# Patient Record
Sex: FEMALE | Race: BLACK OR AFRICAN AMERICAN | NOT HISPANIC OR LATINO | Employment: OTHER | URBAN - NONMETROPOLITAN AREA
[De-identification: names, ages, dates, MRNs, and addresses within clinical notes are randomized per-mention and may not be internally consistent; named-entity substitution may affect disease eponyms.]

---

## 2024-04-22 ENCOUNTER — OFFICE VISIT (OUTPATIENT)
Dept: FAMILY MEDICINE CLINIC | Facility: CLINIC | Age: 69
End: 2024-04-22
Payer: COMMERCIAL

## 2024-04-22 VITALS
TEMPERATURE: 97.5 F | SYSTOLIC BLOOD PRESSURE: 112 MMHG | DIASTOLIC BLOOD PRESSURE: 66 MMHG | HEART RATE: 117 BPM | WEIGHT: 157.8 LBS | BODY MASS INDEX: 29.79 KG/M2 | HEIGHT: 61 IN | OXYGEN SATURATION: 98 %

## 2024-04-22 DIAGNOSIS — Z11.4 SCREENING FOR HIV (HUMAN IMMUNODEFICIENCY VIRUS): ICD-10-CM

## 2024-04-22 DIAGNOSIS — E11.9 TYPE 2 DIABETES MELLITUS WITHOUT COMPLICATION, WITH LONG-TERM CURRENT USE OF INSULIN (HCC): ICD-10-CM

## 2024-04-22 DIAGNOSIS — Z79.4 TYPE 2 DIABETES MELLITUS WITHOUT COMPLICATION, WITH LONG-TERM CURRENT USE OF INSULIN (HCC): ICD-10-CM

## 2024-04-22 DIAGNOSIS — G20.A1 PARKINSON'S DISEASE, UNSPECIFIED WHETHER DYSKINESIA PRESENT, UNSPECIFIED WHETHER MANIFESTATIONS FLUCTUATE: ICD-10-CM

## 2024-04-22 DIAGNOSIS — N39.3 STRESS INCONTINENCE OF URINE: ICD-10-CM

## 2024-04-22 DIAGNOSIS — E03.9 ACQUIRED HYPOTHYROIDISM: Primary | ICD-10-CM

## 2024-04-22 DIAGNOSIS — Z11.59 ENCOUNTER FOR HEPATITIS C SCREENING TEST FOR LOW RISK PATIENT: ICD-10-CM

## 2024-04-22 DIAGNOSIS — R15.9 INCONTINENCE OF FECES, UNSPECIFIED FECAL INCONTINENCE TYPE: ICD-10-CM

## 2024-04-22 PROBLEM — K21.9 GASTROESOPHAGEAL REFLUX DISEASE WITHOUT ESOPHAGITIS: Status: ACTIVE | Noted: 2024-04-22

## 2024-04-22 PROBLEM — R42 VERTIGO: Status: ACTIVE | Noted: 2024-04-22

## 2024-04-22 PROCEDURE — 99204 OFFICE O/P NEW MOD 45 MIN: CPT | Performed by: FAMILY MEDICINE

## 2024-04-22 RX ORDER — ENALAPRIL MALEATE 2.5 MG/1
2.5 TABLET ORAL DAILY
COMMUNITY
Start: 2024-03-19

## 2024-04-22 RX ORDER — BLOOD SUGAR DIAGNOSTIC
STRIP MISCELLANEOUS
COMMUNITY
Start: 2024-01-16

## 2024-04-22 RX ORDER — ISOPROPYL ALCOHOL 0.7 ML/ML
SWAB TOPICAL
COMMUNITY
Start: 2024-03-01

## 2024-04-22 RX ORDER — EMPAGLIFLOZIN, METFORMIN HYDROCHLORIDE 5; 1000 MG/1; MG/1
TABLET, EXTENDED RELEASE ORAL
COMMUNITY
Start: 2024-03-29

## 2024-04-22 RX ORDER — LANCETS 33 GAUGE
EACH MISCELLANEOUS
COMMUNITY
Start: 2024-02-23

## 2024-04-22 RX ORDER — LEVOTHYROXINE SODIUM 0.07 MG/1
75 TABLET ORAL DAILY
COMMUNITY
Start: 2024-04-15

## 2024-04-22 RX ORDER — PANTOPRAZOLE SODIUM 40 MG/1
40 TABLET, DELAYED RELEASE ORAL
COMMUNITY
Start: 2024-04-16

## 2024-04-22 RX ORDER — AMMONIUM LACTATE 12 G/100G
CREAM TOPICAL
COMMUNITY
Start: 2024-03-19

## 2024-04-22 RX ORDER — ATORVASTATIN CALCIUM 40 MG/1
40 TABLET, FILM COATED ORAL DAILY
COMMUNITY
Start: 2024-02-09

## 2024-04-22 RX ORDER — FAMOTIDINE 40 MG/1
40 TABLET, FILM COATED ORAL EVERY MORNING
COMMUNITY
Start: 2024-02-14

## 2024-04-22 RX ORDER — TEMAZEPAM 30 MG/1
30 CAPSULE ORAL
COMMUNITY
Start: 2024-04-15 | End: 2024-04-22 | Stop reason: ALTCHOICE

## 2024-04-23 NOTE — PROGRESS NOTES
Assessment/Plan:    No problem-specific Assessment & Plan notes found for this encounter.       Diagnoses and all orders for this visit:    Acquired hypothyroidism  -   Patient reports diagnosis of hypothyroidism x 1 year ago, has been maintained on levothyroxine 75 mcg daily  -PCP to obtain baseline lab work to assess need for medical management changes  -Patient denies palpitations but does report worsening tremors likely in setting of underlying Parkinson's disease versus medication side effect  - CBC and differential; Future  -     Comprehensive metabolic panel; Future  -     T4, free; Future  -     TSH, 3rd generation; Future    Parkinson's disease, unspecified whether dyskinesia present, unspecified whether manifestations fluctuate  -Patient with known history of Parkinson's diagnosed x 2 years ago follows up with [facility] neurology. Patient is accompanied by family friend at today's visit who expresses concern for worsening labile moods i.e. periods of severe aggression, paranoia and depressive symptoms x 7-8 months.  PCP discussed at length that would consider further evaluation with neurology as some symptoms may be progression of Parkinson's or Parkinson's dementia versus underlying mood disorder.  PCP will continue to evaluate and refer to psychiatry/psychology.    Incontinence of feces, unspecified fecal incontinence type  -Patient reports history of fecal and urinary incontinence x 4 years and is currently undergoing evaluation with urology with reported uterine prolapse likely contributing to symptomology  -PCP to obtain records from urology and discuss further referral to gastroenterology as appropriate    Stress incontinence of urine  - See A/P under incontinence of feces    Type 2 diabetes mellitus without complication, with long-term current use of insulin ([facility])  -     Patient reports she was diagnosed x 11 years ago and has been maintained on oral medications since then  -     She denies any  hypoglycemic episodes or hospitalizations  -    CBC and differential; Future  -     Comprehensive metabolic panel; Future  -     Lipid panel; Future  -     Hemoglobin A1C; Future  -     Albumin / creatinine urine ratio; Future    Screening for HIV (human immunodeficiency virus)  -     HIV 1/2 AG/AB w Reflex [facility] for 2 yr old and above; Future    Encounter for hepatitis C screening test for low risk patient  -     Hepatitis C antibody; Future            Subjective:      Patient ID: Sylvie Rodríguez is a 69 y.o. female.    Patient is a 69-year-old female with past medical history of Parkinson's disease, type 2 diabetes mellitus, BPPV, GERD, and acquired hypothyroidism who presents to establish care  Patient reports that over the last 4 years she has experienced worsening urinary and fecal incontinence  Patient reports that in the last year she has had worsening colicky pain especially postprandially and notices fecal incontinence typically within 15 minutes of p.o. intake  Patient reports that she has been undergoing evaluation with urology x 1 month and was also ordered a transvaginal ultrasound at which time she was found to have uterine prolapse likely being etiology of urinary incontinence  Patient that urinary incontinence also occurs with coughing, bending, or bearing down  Patient denies any hematuria or dysuria but does report intermittent vaginal itching.  No discharge or abnormal uterine bleeding noted thus far.  Patient does report that a month ago she had an episode of maroon-colored stool but denied any other alarm symptoms i.e. nausea, vomiting, unexpected weight loss  Patient reports this was a single episode and reports that typically she has softer stools  PCP reviewed [place] stool chart patient noting that baseline BM is type 5 and when she experiences colicky pain and fecal incontinence is a type 7  Patient reports that she experiences about 3-4 episodes of colicky pain and softer stools within a 1  "month.  Patient reports that her last colonoscopy was greater than 10 years ago and reports that at that time she was told she had benign polyps and internal hemorrhoids    Patient is accompanied by a family friend who reports concern that over the last 7-8 months she has had worsening labile moods i.e. periods of severe aggression, paranoia and depressive symptoms  Patient reports that she has been following with Neurology x 2 years at which time she was diagnosed with Parkinson's and has been maintained on Sinemet only  Patient does admit that she has had multiple episodes where she starts a task at home i.e. cooking in the kitchen and forgets to turn off the stove patient has had episodes where she will go to a grocery store and forget what she was attempting to buy and will walk out of the store and not be able to find her way back home.  Family friend also reports that patient goes into \"severe moods\" where she will not talk to anyone is severely withdrawn and experiences episodes of suicidal ideation.  Patient, however, states that at this time she does not SI.  Patient also denies any AH or VH.              Depression Screening and Follow-up Plan: Patient's depression screening was positive with a PHQ-2 score of 6. Their PHQ-9 score was 18. Patient assessed for underlying major depression. Brief counseling provided and recommend additional follow-up/re-evaluation next office visit.     Urinary Incontinence Plan of Care: counseling topics discussed: limiting fluid intake 3-4 hours before bed. Patient reports that she already follows with Urology at [facility].           The following portions of the patient's history were reviewed and updated as appropriate: allergies, current medications, past family history, past medical history, past social history, past surgical history, and problem list.    Review of Systems   Respiratory:  Negative for shortness of breath.    Cardiovascular:  Negative for chest pain. " "  Gastrointestinal:  Positive for diarrhea. Negative for blood in stool.   Genitourinary:  Positive for urgency.   Psychiatric/Behavioral:  Positive for agitation. Negative for hallucinations and suicidal ideas. The patient is nervous/anxious.          Objective:      /66 (BP Location: Left arm)   Pulse (!) 117   Temp 97.5 °F (36.4 °C) (Tympanic)   Ht 5' 0.6\" (1.539 m)   Wt 71.6 kg (157 lb 12.8 oz)   SpO2 98%   BMI 30.21 kg/m²          Physical Exam  Constitutional:       Appearance: She is well-developed.   HENT:      Head: Normocephalic and atraumatic.      Right Ear: External ear normal.      Left Ear: External ear normal.      Nose: Nose normal.   Eyes:      Conjunctiva/sclera: Conjunctivae normal.   Cardiovascular:      Rate and Rhythm: Normal rate and regular rhythm.      Heart sounds: Normal heart sounds.   Pulmonary:      Effort: Pulmonary effort is normal.      Breath sounds: Normal breath sounds.   Abdominal:      General: Bowel sounds are normal.      Palpations: Abdomen is soft. There is no mass.   Musculoskeletal:         General: Normal range of motion.   Skin:     General: Skin is warm and dry.   Neurological:      Mental Status: She is alert and oriented to person, place, and time.           "

## 2024-04-25 ENCOUNTER — TELEPHONE (OUTPATIENT)
Age: 69
End: 2024-04-25

## 2024-04-25 ENCOUNTER — LAB (OUTPATIENT)
Dept: LAB | Facility: CLINIC | Age: 69
End: 2024-04-25
Payer: COMMERCIAL

## 2024-04-25 DIAGNOSIS — Z11.59 ENCOUNTER FOR HEPATITIS C SCREENING TEST FOR LOW RISK PATIENT: ICD-10-CM

## 2024-04-25 DIAGNOSIS — Z79.4 TYPE 2 DIABETES MELLITUS WITHOUT COMPLICATION, WITH LONG-TERM CURRENT USE OF INSULIN (HCC): ICD-10-CM

## 2024-04-25 DIAGNOSIS — E03.9 ACQUIRED HYPOTHYROIDISM: ICD-10-CM

## 2024-04-25 DIAGNOSIS — E11.9 TYPE 2 DIABETES MELLITUS WITHOUT COMPLICATION, WITH LONG-TERM CURRENT USE OF INSULIN (HCC): ICD-10-CM

## 2024-04-25 DIAGNOSIS — Z11.4 SCREENING FOR HIV (HUMAN IMMUNODEFICIENCY VIRUS): ICD-10-CM

## 2024-04-25 LAB
ALBUMIN SERPL BCP-MCNC: 4.2 G/DL (ref 3.5–5)
ALP SERPL-CCNC: 107 U/L (ref 34–104)
ALT SERPL W P-5'-P-CCNC: 31 U/L (ref 7–52)
ANION GAP SERPL CALCULATED.3IONS-SCNC: 10 MMOL/L (ref 4–13)
AST SERPL W P-5'-P-CCNC: 31 U/L (ref 13–39)
BASOPHILS # BLD AUTO: 0.06 THOUSANDS/ÂΜL (ref 0–0.1)
BASOPHILS NFR BLD AUTO: 1 % (ref 0–1)
BILIRUB SERPL-MCNC: 0.52 MG/DL (ref 0.2–1)
BUN SERPL-MCNC: 9 MG/DL (ref 5–25)
CALCIUM SERPL-MCNC: 9.6 MG/DL (ref 8.4–10.2)
CHLORIDE SERPL-SCNC: 99 MMOL/L (ref 96–108)
CHOLEST SERPL-MCNC: 223 MG/DL
CO2 SERPL-SCNC: 28 MMOL/L (ref 21–32)
CREAT SERPL-MCNC: 0.46 MG/DL (ref 0.6–1.3)
CREAT UR-MCNC: 35 MG/DL
EOSINOPHIL # BLD AUTO: 0.14 THOUSAND/ÂΜL (ref 0–0.61)
EOSINOPHIL NFR BLD AUTO: 1 % (ref 0–6)
ERYTHROCYTE [DISTWIDTH] IN BLOOD BY AUTOMATED COUNT: 15.6 % (ref 11.6–15.1)
EST. AVERAGE GLUCOSE BLD GHB EST-MCNC: 197 MG/DL
GFR SERPL CREATININE-BSD FRML MDRD: 102 ML/MIN/1.73SQ M
GLUCOSE P FAST SERPL-MCNC: 178 MG/DL (ref 65–99)
HBA1C MFR BLD: 8.5 %
HCT VFR BLD AUTO: 45.2 % (ref 34.8–46.1)
HCV AB SER QL: NORMAL
HDLC SERPL-MCNC: 51 MG/DL
HGB BLD-MCNC: 14.1 G/DL (ref 11.5–15.4)
HIV 1+2 AB+HIV1 P24 AG SERPL QL IA: NORMAL
HIV 2 AB SERPL QL IA: NORMAL
HIV1 AB SERPL QL IA: NORMAL
HIV1 P24 AG SERPL QL IA: NORMAL
IMM GRANULOCYTES # BLD AUTO: 0.02 THOUSAND/UL (ref 0–0.2)
IMM GRANULOCYTES NFR BLD AUTO: 0 % (ref 0–2)
LYMPHOCYTES # BLD AUTO: 3.24 THOUSANDS/ÂΜL (ref 0.6–4.47)
LYMPHOCYTES NFR BLD AUTO: 32 % (ref 14–44)
MCH RBC QN AUTO: 27.5 PG (ref 26.8–34.3)
MCHC RBC AUTO-ENTMCNC: 31.2 G/DL (ref 31.4–37.4)
MCV RBC AUTO: 88 FL (ref 82–98)
MICROALBUMIN UR-MCNC: <7 MG/L
MICROALBUMIN/CREAT 24H UR: <20 MG/G CREATININE (ref 0–30)
MONOCYTES # BLD AUTO: 0.7 THOUSAND/ÂΜL (ref 0.17–1.22)
MONOCYTES NFR BLD AUTO: 7 % (ref 4–12)
NEUTROPHILS # BLD AUTO: 6.06 THOUSANDS/ÂΜL (ref 1.85–7.62)
NEUTS SEG NFR BLD AUTO: 59 % (ref 43–75)
NONHDLC SERPL-MCNC: 172 MG/DL
NRBC BLD AUTO-RTO: 0 /100 WBCS
PLATELET # BLD AUTO: 297 THOUSANDS/UL (ref 149–390)
PMV BLD AUTO: 10.4 FL (ref 8.9–12.7)
POTASSIUM SERPL-SCNC: 4 MMOL/L (ref 3.5–5.3)
PROT SERPL-MCNC: 7.5 G/DL (ref 6.4–8.4)
RBC # BLD AUTO: 5.13 MILLION/UL (ref 3.81–5.12)
SODIUM SERPL-SCNC: 137 MMOL/L (ref 135–147)
T4 FREE SERPL-MCNC: 0.92 NG/DL (ref 0.61–1.12)
TRIGL SERPL-MCNC: 429 MG/DL
TSH SERPL DL<=0.05 MIU/L-ACNC: 0.7 UIU/ML (ref 0.45–4.5)
WBC # BLD AUTO: 10.22 THOUSAND/UL (ref 4.31–10.16)

## 2024-04-25 PROCEDURE — 86803 HEPATITIS C AB TEST: CPT

## 2024-04-25 PROCEDURE — 84439 ASSAY OF FREE THYROXINE: CPT

## 2024-04-25 PROCEDURE — 87389 HIV-1 AG W/HIV-1&-2 AB AG IA: CPT

## 2024-04-25 PROCEDURE — 83036 HEMOGLOBIN GLYCOSYLATED A1C: CPT

## 2024-04-25 PROCEDURE — 36415 COLL VENOUS BLD VENIPUNCTURE: CPT

## 2024-04-25 PROCEDURE — 85025 COMPLETE CBC W/AUTO DIFF WBC: CPT

## 2024-04-25 PROCEDURE — 82043 UR ALBUMIN QUANTITATIVE: CPT

## 2024-04-25 PROCEDURE — 80061 LIPID PANEL: CPT

## 2024-04-25 PROCEDURE — 3052F HG A1C>EQUAL 8.0%<EQUAL 9.0%: CPT | Performed by: FAMILY MEDICINE

## 2024-04-25 PROCEDURE — 82570 ASSAY OF URINE CREATININE: CPT

## 2024-04-25 PROCEDURE — 80053 COMPREHEN METABOLIC PANEL: CPT

## 2024-04-25 PROCEDURE — 84443 ASSAY THYROID STIM HORMONE: CPT

## 2024-04-25 NOTE — TELEPHONE ENCOUNTER
Pt called today stating glucose today was 282 and yesterday it was 253.  Said she has not had any Toujeo insulin in a few weeks.  Do you want to prescribe?  Please send to [facility].

## 2024-04-26 NOTE — TELEPHONE ENCOUNTER
Pharmacy called and sated Pt was there and was requesting her refill for insuline. Pharmacist was informed of parts of the providers note on Pt's chart Pharmacist sated she would inform the Pt.

## 2024-04-26 NOTE — RESULT ENCOUNTER NOTE
Hi Ladies,    PCP already sent message regarding phone call about blood sugars being elevated. Please inform patient regarding the labs as noted below.    After review of your lab work, overall things look stable. Blood counts, kidney, liver and thyroid function are normal. The Hep C and HIV are negative. Cholesterol levels were very elevated and we need to discuss treatment for this. Hemoglobin A1c at 8.5 indicating poorly controlled diabetes at this time. Please let me know if you have any questions or concerns.    Best,

## 2024-04-29 ENCOUNTER — OFFICE VISIT (OUTPATIENT)
Dept: FAMILY MEDICINE CLINIC | Facility: CLINIC | Age: 69
End: 2024-04-29
Payer: COMMERCIAL

## 2024-04-29 VITALS
WEIGHT: 153.4 LBS | DIASTOLIC BLOOD PRESSURE: 72 MMHG | HEART RATE: 110 BPM | HEIGHT: 61 IN | TEMPERATURE: 98.6 F | BODY MASS INDEX: 28.96 KG/M2 | OXYGEN SATURATION: 100 % | SYSTOLIC BLOOD PRESSURE: 110 MMHG

## 2024-04-29 DIAGNOSIS — R10.13 EPIGASTRIC PAIN: Primary | ICD-10-CM

## 2024-04-29 DIAGNOSIS — E11.9 TYPE 2 DIABETES MELLITUS WITHOUT COMPLICATION, WITH LONG-TERM CURRENT USE OF INSULIN (HCC): ICD-10-CM

## 2024-04-29 DIAGNOSIS — Z79.4 TYPE 2 DIABETES MELLITUS WITHOUT COMPLICATION, WITH LONG-TERM CURRENT USE OF INSULIN (HCC): ICD-10-CM

## 2024-04-29 PROBLEM — N81.4 UTERINE PROLAPSE: Status: ACTIVE | Noted: 2024-04-29

## 2024-04-29 PROBLEM — G20.A1 PARKINSON'S DISEASE: Status: ACTIVE | Noted: 2024-04-29

## 2024-04-29 PROCEDURE — 99214 OFFICE O/P EST MOD 30 MIN: CPT | Performed by: FAMILY MEDICINE

## 2024-04-29 PROCEDURE — G2211 COMPLEX E/M VISIT ADD ON: HCPCS | Performed by: FAMILY MEDICINE

## 2024-04-29 PROCEDURE — 1159F MED LIST DOCD IN RCRD: CPT | Performed by: FAMILY MEDICINE

## 2024-04-29 PROCEDURE — 1160F RVW MEDS BY RX/DR IN RCRD: CPT | Performed by: FAMILY MEDICINE

## 2024-04-29 NOTE — PROGRESS NOTES
Assessment/Plan:    No problem-specific Assessment & Plan notes found for this encounter.       Diagnoses and all orders for this visit:    Epigastric pain  -Patient with known history of GERD and possible hiatal hernia presents with concern of worsening epigastric pain and 3 episodes of black tarry stools today  -Patient reports that she has been taking her PPI but feels that this is only slightly helping her symptoms at this time  -PCP discussed with patient that she is already on max dose PPI therapy and symptoms may be due to underlying PUD versus GERD exacerbation  -Patient vitals are stable and she denies any bright red blood per rectum at this time.  Patient already is established with gastroenterology Dr. Calixto and was counseled to follow-up with him for further recommendations at this time.    Type 2 diabetes mellitus without complication, with long-term current use of insulin ([facility])  -     Patient with blood work showing hemoglobin A1c of 8.5, PCP clarified with patient she was not on insulin she was actually on Trulicity but she discontinued this as she experienced severe itching with this medication  -Patient continues on Synjardy and after further discussion patient will trial on Sitagliptin for further DM control  -     sitaGLIPtin (JANUVIA) 100 mg tablet; Take 1 tablet (100 mg total) by mouth daily          Subjective:      Patient ID: Sylvie Rodríguez is a 69 y.o. female.    Abdominal Pain  This is a recurrent problem. The current episode started more than 1 year ago. The problem has been waxing and waning. The pain is located in the epigastric region. The quality of the pain is burning. The abdominal pain does not radiate. Pertinent negatives include no constipation or hematochezia. Associated symptoms comments: 3 episodes of dark tarry stools. Nothing aggravates the pain. The pain is relieved by Nothing. She has tried proton pump inhibitors for the symptoms. The treatment provided mild relief. Prior  "diagnostic workup includes GI consult.   Diabetes  She presents for her follow-up diabetic visit. She has type 2 diabetes mellitus. Disease course: A1C at 8.5. There are no hypoglycemic associated symptoms. Pertinent negatives for diabetes include no foot paresthesias. There are no hypoglycemic complications. Pertinent negatives for diabetic complications include no nephropathy. Risk factors for coronary artery disease include diabetes mellitus, dyslipidemia and obesity. Current diabetic treatment includes oral agent (dual therapy). She is compliant with treatment all of the time. An ACE inhibitor/angiotensin II receptor blocker is being taken.       The following portions of the patient's history were reviewed and updated as appropriate: allergies, current medications, past family history, past medical history, past social history, past surgical history, and problem list.    Review of Systems   Gastrointestinal:  Positive for abdominal pain. Negative for constipation and hematochezia.        Darky tarry stools         Objective:      /72 (BP Location: Left arm)   Pulse (!) 110   Temp 98.6 °F (37 °C) (Tympanic)   Ht 5' 0.6\" (1.539 m)   Wt 69.6 kg (153 lb 6.4 oz)   SpO2 100%   BMI 29.37 kg/m²          Physical Exam  Constitutional:       Appearance: She is well-developed.   HENT:      Head: Normocephalic and atraumatic.      Right Ear: External ear normal.      Left Ear: External ear normal.      Nose: Nose normal.   Eyes:      Conjunctiva/sclera: Conjunctivae normal.   Cardiovascular:      Rate and Rhythm: Normal rate and regular rhythm.      Heart sounds: Normal heart sounds.   Pulmonary:      Effort: Pulmonary effort is normal.      Breath sounds: Normal breath sounds.   Skin:     General: Skin is warm and dry.   Neurological:      Mental Status: She is alert and oriented to person, place, and time.           "

## 2024-04-30 ENCOUNTER — TELEPHONE (OUTPATIENT)
Dept: FAMILY MEDICINE CLINIC | Facility: CLINIC | Age: 69
End: 2024-04-30

## 2024-04-30 NOTE — TELEPHONE ENCOUNTER
PCP already sent message regarding phone call about blood sugars being elevated. Please inform patient regarding the labs as noted below.     After review of your lab work, overall things look stable. Blood counts, kidney, liver and thyroid function are normal. The Hep C and HIV are negative. Cholesterol levels were very elevated and we need to discuss treatment for this. Hemoglobin A1c at 8.5 indicating poorly controlled diabetes at this time. Please let me know if you have any questions or concerns.     Best,        *Called patient to reply dr's response. She didn't answer, I left a voicemail to called the office back to discuss labs results.

## 2024-05-06 DIAGNOSIS — E11.9 TYPE 2 DIABETES MELLITUS WITHOUT COMPLICATION, WITH LONG-TERM CURRENT USE OF INSULIN (HCC): Primary | ICD-10-CM

## 2024-05-06 DIAGNOSIS — Z79.4 TYPE 2 DIABETES MELLITUS WITHOUT COMPLICATION, WITH LONG-TERM CURRENT USE OF INSULIN (HCC): Primary | ICD-10-CM

## 2024-05-08 NOTE — TELEPHONE ENCOUNTER
[id number]:   Called patient to see if refill is needed blood sugar strips are needed. Patient does need refill.

## 2024-05-10 RX ORDER — BLOOD SUGAR DIAGNOSTIC
STRIP MISCELLANEOUS
Qty: 100 EACH | Refills: 3 | Status: SHIPPED | OUTPATIENT
Start: 2024-05-10

## 2024-06-24 ENCOUNTER — OFFICE VISIT (OUTPATIENT)
Dept: FAMILY MEDICINE CLINIC | Facility: CLINIC | Age: 69
End: 2024-06-24
Payer: COMMERCIAL

## 2024-06-24 VITALS
HEART RATE: 92 BPM | OXYGEN SATURATION: 99 % | DIASTOLIC BLOOD PRESSURE: 58 MMHG | TEMPERATURE: 96 F | WEIGHT: 155 LBS | HEIGHT: 61 IN | SYSTOLIC BLOOD PRESSURE: 110 MMHG | BODY MASS INDEX: 29.27 KG/M2

## 2024-06-24 DIAGNOSIS — E78.2 MIXED HYPERLIPIDEMIA: ICD-10-CM

## 2024-06-24 DIAGNOSIS — Z00.00 MEDICARE ANNUAL WELLNESS VISIT, SUBSEQUENT: Primary | ICD-10-CM

## 2024-06-24 PROCEDURE — G0439 PPPS, SUBSEQ VISIT: HCPCS | Performed by: FAMILY MEDICINE

## 2024-06-24 RX ORDER — ATORVASTATIN CALCIUM 40 MG/1
40 TABLET, FILM COATED ORAL DAILY
Qty: 180 TABLET | Refills: 0 | Status: SHIPPED | OUTPATIENT
Start: 2024-06-24 | End: 2024-06-26

## 2024-06-24 RX ORDER — INSULIN GLARGINE 100 [IU]/ML
50 INJECTION, SOLUTION SUBCUTANEOUS
COMMUNITY
End: 2024-06-24 | Stop reason: ALTCHOICE

## 2024-06-24 RX ORDER — TEMAZEPAM 7.5 MG/1
7.5 CAPSULE ORAL
COMMUNITY
End: 2024-06-24 | Stop reason: ALTCHOICE

## 2024-06-24 NOTE — PATIENT INSTRUCTIONS

## 2024-06-24 NOTE — PROGRESS NOTES
Ambulatory Visit  Name: Sylvie Rodríguez      : 1955      MRN: [id number]  Encounter Provider: Cindy Saleh MD  Encounter Date: 2024   Encounter department: [facility]    Assessment & Plan   1. Medicare annual wellness visit, subsequent  -Patient reports completing colonoscopy approximately 5 years ago, states that 1 polyp was found but has not followed up thereafter for routine screening     -PCP counseled patient that she should follow-up with her new provider after she transitions moving to [place].  Patient is to move to [place] at the end of July, PCP did discuss with her son currently living in [place] and confirmed moved.  Patient will be due for colorectal cancer screening, breast cancer screening, and DEXA screening at that time  -PCP discussed with patient that as part of transition to a different state will prepare EMR and paper lips so that patient may have sufficient medication until she establishes with new PCP  - atorvastatin (LIPITOR) 40 mg tablet; Take 1 tablet (40 mg total) by mouth daily  2. Mixed hyperlipidemia  -     atorvastatin (LIPITOR) 40 mg tablet; Take 1 tablet (40 mg total) by mouth daily      Depression Screening and Follow-up Plan: Patient was screened for depression during today's encounter. They screened negative with a PHQ-2 score of 1.    Falls Plan of Care: balance, strength, and gait training instructions were provided.       Preventive health issues were discussed with patient, and age appropriate screening tests were ordered as noted in patient's After Visit Summary. Personalized health advice and appropriate referrals for health education or preventive services given if needed, as noted in patient's After Visit Summary.    History of Present Illness     Patient did complete a 5-day course of Macrobid which was appropriate based on sensitivities from previous urine culture x 2 weeks ago         Patient Care Team:  Cindy  MD Delfino as PCP - General (Family Medicine)  Twin Morataya MD as PCP - PCP-[facility] ([facility])    Review of Systems   Genitourinary:  Negative for difficulty urinating.     Medical History Reviewed by provider this encounter:       Annual Wellness Visit Questionnaire   Sylvie is here for her Subsequent Wellness visit. Last Medicare Wellness visit information reviewed, patient interviewed and updates made to the record today.      Health Risk Assessment:   Patient rates overall health as good. Patient feels that their physical health rating is slightly better. Patient is satisfied with their life. Eyesight was rated as same. Hearing was rated as same. Patient feels that their emotional and mental health rating is slightly better. Patients states they are often angry. Patient states they are often unusually tired/fatigued. Pain experienced in the last 7 days has been some. Patient's pain rating has been 7/10. Patient states that she has experienced no weight loss or gain in last 6 months.     Depression Screening:   PHQ-2 Score: 1      Fall Risk Screening:   In the past year, patient has experienced: history of falling in past year    Number of falls: 2 or more  Injured during fall?: Yes    Feels unsteady when standing or walking?: Yes    Worried about falling?: Yes      Urinary Incontinence Screening:   Patient has leaked urine accidently in the last six months.     Home Safety:  Patient does not have trouble with stairs inside or outside of their home. Patient has working smoke alarms and has working carbon monoxide detector. Home safety hazards include: none.     Nutrition:   Current diet is Regular and Low Cholesterol.     Medications:   Patient is not currently taking any over-the-counter supplements. Patient is able to manage medications.     Activities of Daily Living (ADLs)/Instrumental Activities of Daily Living (IADLs):   Walk and transfer into and out of bed and chair?: Yes  Dress and groom  yourself?: Yes    Bathe or shower yourself?: Yes    Feed yourself? Yes  Do your laundry/housekeeping?: No  Manage your money, pay your bills and track your expenses?: Yes  Make your own meals?: No    Do your own shopping?: Yes    Previous Hospitalizations:   Any hospitalizations or ED visits within the last 12 months?: Yes    How many hospitalizations have you had in the last year?: 1-2    Hospitalization Comments: Infection in the urine  Patient did complete a 5-day course of Macrobid which was appropriate based on sensitivities from previous urine culture x 2 weeks ago    Advance Care Planning:   Living will: No    Durable POA for healthcare: No    Advanced directive: No      PREVENTIVE SCREENINGS      Cardiovascular Screening:    General: Screening Current and Risks and Benefits Discussed      Diabetes Screening:     General: Screening Not Indicated, History Diabetes and Risks and Benefits Discussed      Colorectal Cancer Screening:     General: Risks and Benefits Discussed      Cervical Cancer Screening:    General: Screening Not Indicated      Osteoporosis Screening:    General: Risks and Benefits Discussed      Lung Cancer Screening:     General: Screening Not Indicated      Hepatitis C Screening:    General: Screening Current    Screening, Brief Intervention, and Referral to Treatment (SBIRT)    Screening  Typical number of drinks in a day: 0  Typical number of drinks in a week: 0  Interpretation: Low risk drinking behavior.    AUDIT-C Screenin) How often did you have a drink containing alcohol in the past year? never  2) How many drinks did you have on a typical day when you were drinking in the past year? 0  3) How often did you have 6 or more drinks on one occasion in the past year? never    AUDIT-C Score: 0  Interpretation: Score 0-2 (female): Negative screen for alcohol misuse    Single Item Drug Screening:  How often have you used an illegal drug (including marijuana) or a prescription medication  "for non-medical reasons in the past year? never    Single Item Drug Screen Score: 0  Interpretation: Negative screen for possible drug use disorder    Social Determinants of Health     Food Insecurity: No Food Insecurity (6/24/2024)    Hunger Vital Sign     Worried About Running Out of Food in the Last Year: Never true     Ran Out of Food in the Last Year: Never true   Transportation Needs: No Transportation Needs (6/24/2024)    PRAPARE - Transportation     Lack of Transportation (Medical): No     Lack of Transportation (Non-Medical): No   Housing Stability: Low Risk  (6/24/2024)    Housing Stability Vital Sign     Unable to Pay for Housing in the Last Year: No     Number of Times Moved in the Last Year: 1     Homeless in the Last Year: No   Utilities: Not At Risk (6/24/2024)    [facility] Utilities     Threatened with loss of utilities: No     No results found.    Objective     /58 (BP Location: Left arm)   Pulse 92   Temp (!) 96 °F (35.6 °C) (Tympanic)   Ht 5' 0.6\" (1.539 m)   Wt 70.3 kg (155 lb)   SpO2 99%   BMI 29.67 kg/m²     Physical Exam  Constitutional:       Appearance: She is well-developed.   HENT:      Head: Normocephalic and atraumatic.      Right Ear: External ear normal.      Left Ear: External ear normal.   Eyes:      Conjunctiva/sclera: Conjunctivae normal.   Cardiovascular:      Rate and Rhythm: Normal rate and regular rhythm.      Pulses: no weak pulses.           Dorsalis pedis pulses are 2+ on the right side and 2+ on the left side.        Posterior tibial pulses are 2+ on the right side and 2+ on the left side.      Heart sounds: Normal heart sounds.   Pulmonary:      Effort: Pulmonary effort is normal.      Breath sounds: Normal breath sounds.   Musculoskeletal:      Cervical back: Normal range of motion and neck supple.   Feet:      Right foot:      Skin integrity: No ulcer, skin breakdown, erythema, warmth, callus or dry skin.      Left foot:      Skin integrity: No ulcer, skin " breakdown, erythema, warmth, callus or dry skin.   Skin:     General: Skin is warm and dry.   Neurological:      Mental Status: She is alert and oriented to person, place, and time.       Diabetic Foot Exam    Patient's shoes and socks removed.    Right Foot/Ankle   Right Foot Inspection  Skin Exam: skin normal and skin intact. No dry skin, no warmth, no callus, no erythema, no maceration, no abnormal color, no pre-ulcer, no ulcer and no callus.     Toe Exam: ROM and strength within normal limits.     Sensory   Vibration: intact  Proprioception: intact  Monofilament testing: intact    Vascular  The right DP pulse is 2+. The right PT pulse is 2+.     Left Foot/Ankle  Left Foot Inspection  Skin Exam: skin normal and skin intact. No dry skin, no warmth, no erythema, no maceration, normal color, no pre-ulcer, no ulcer and no callus.     Toe Exam: ROM and strength within normal limits.     Sensory   Vibration: intact  Proprioception: intact  Monofilament testing: intact    Vascular  The left DP pulse is 2+. The left PT pulse is 2+.     Assign Risk Category  No deformity present  No loss of protective sensation  No weak pulses  Risk: 0      Administrative Statements

## 2024-06-26 DIAGNOSIS — Z00.00 MEDICARE ANNUAL WELLNESS VISIT, SUBSEQUENT: ICD-10-CM

## 2024-06-26 DIAGNOSIS — Z79.4 TYPE 2 DIABETES MELLITUS WITHOUT COMPLICATION, WITH LONG-TERM CURRENT USE OF INSULIN (HCC): ICD-10-CM

## 2024-06-26 DIAGNOSIS — E78.2 MIXED HYPERLIPIDEMIA: ICD-10-CM

## 2024-06-26 DIAGNOSIS — E11.9 TYPE 2 DIABETES MELLITUS WITHOUT COMPLICATION, WITH LONG-TERM CURRENT USE OF INSULIN (HCC): ICD-10-CM

## 2024-06-26 RX ORDER — GLUCOSAM/CHON-MSM1/C/MANG/BOSW 500-416.6
TABLET ORAL
Qty: 100 EACH | Refills: 5 | Status: SHIPPED | OUTPATIENT
Start: 2024-06-26

## 2024-06-26 RX ORDER — SITAGLIPTIN 100 MG/1
100 TABLET, FILM COATED ORAL DAILY
Qty: 90 TABLET | Refills: 1 | Status: SHIPPED | OUTPATIENT
Start: 2024-06-26

## 2024-06-26 RX ORDER — ATORVASTATIN CALCIUM 40 MG/1
40 TABLET, FILM COATED ORAL DAILY
Qty: 180 TABLET | Refills: 0 | Status: SHIPPED | OUTPATIENT
Start: 2024-06-26

## 2024-07-02 DIAGNOSIS — J30.2 SEASONAL ALLERGIES: Primary | ICD-10-CM

## 2024-07-15 RX ORDER — LORATADINE 10 MG/1
10 TABLET ORAL DAILY
Qty: 30 TABLET | OUTPATIENT
Start: 2024-07-15

## 2024-07-15 NOTE — TELEPHONE ENCOUNTER
Hi Team,    We have been contacted by the pharmacy to refill your prescription. We want to provide the very best care we can to our patients, and it requires we check in regarding refills in between your regularly scheduled visits. We realize you may not be aware your pharmacy has reached out to us or you may no longer need this medication. Because of this and other reasons, our office policy is to have the patient contact the office via MY Chart with a message or give us a call at [phone] to have us send over a refill.    Thank you for understanding. Your care is important to us.    Kindly,    [facility]

## 2024-07-16 NOTE — TELEPHONE ENCOUNTER
# [id number] contact the patient  to see if patient needs a med refill of Claritin.  Upon review of med list .  It is not a medication that is on patient's med list.  Patient did request she have a refill due to her suffering with allergies.

## 2024-07-17 RX ORDER — LORATADINE 10 MG/1
10 TABLET ORAL DAILY
Qty: 90 TABLET | Refills: 1 | Status: SHIPPED | OUTPATIENT
Start: 2024-07-17 | End: 2024-07-18 | Stop reason: SDUPTHER

## 2024-07-18 RX ORDER — ENALAPRIL MALEATE 2.5 MG/1
2.5 TABLET ORAL DAILY
Qty: 90 TABLET | Refills: 1 | Status: SHIPPED | OUTPATIENT
Start: 2024-07-18 | End: 2024-07-19 | Stop reason: CLARIF

## 2024-07-18 RX ORDER — FAMOTIDINE 40 MG/1
40 TABLET, FILM COATED ORAL EVERY MORNING
Qty: 90 TABLET | Refills: 1 | Status: SHIPPED | OUTPATIENT
Start: 2024-07-18 | End: 2024-07-19 | Stop reason: CLARIF

## 2024-07-18 RX ORDER — LEVOTHYROXINE SODIUM 0.07 MG/1
75 TABLET ORAL DAILY
Qty: 90 TABLET | Refills: 1 | Status: SHIPPED | OUTPATIENT
Start: 2024-07-18 | End: 2024-07-19 | Stop reason: CLARIF

## 2024-07-18 RX ORDER — LORATADINE 10 MG/1
10 TABLET ORAL DAILY
Qty: 90 TABLET | Refills: 1 | Status: SHIPPED | OUTPATIENT
Start: 2024-07-18 | End: 2024-07-19 | Stop reason: CLARIF

## 2024-07-18 RX ORDER — ATORVASTATIN CALCIUM 40 MG/1
40 TABLET, FILM COATED ORAL DAILY
Qty: 180 TABLET | Refills: 0 | Status: SHIPPED | OUTPATIENT
Start: 2024-07-18 | End: 2024-07-19 | Stop reason: CLARIF

## 2024-07-18 RX ORDER — PANTOPRAZOLE SODIUM 40 MG/1
40 TABLET, DELAYED RELEASE ORAL
Qty: 180 TABLET | Refills: 1 | Status: SHIPPED | OUTPATIENT
Start: 2024-07-18 | End: 2024-07-19 | Stop reason: CLARIF

## 2024-07-18 RX ORDER — EMPAGLIFLOZIN, METFORMIN HYDROCHLORIDE 5; 1000 MG/1; MG/1
1 TABLET, EXTENDED RELEASE ORAL 2 TIMES DAILY
Qty: 180 TABLET | Refills: 1 | Status: SHIPPED | OUTPATIENT
Start: 2024-07-18 | End: 2024-07-19 | Stop reason: CLARIF

## 2024-07-19 ENCOUNTER — CLINICAL SUPPORT (OUTPATIENT)
Dept: FAMILY MEDICINE CLINIC | Facility: CLINIC | Age: 69
End: 2024-07-19
Payer: COMMERCIAL

## 2024-07-19 ENCOUNTER — OFFICE VISIT (OUTPATIENT)
Dept: FAMILY MEDICINE CLINIC | Facility: CLINIC | Age: 69
End: 2024-07-19
Payer: COMMERCIAL

## 2024-07-19 ENCOUNTER — TELEPHONE (OUTPATIENT)
Dept: FAMILY MEDICINE CLINIC | Facility: CLINIC | Age: 69
End: 2024-07-19

## 2024-07-19 VITALS
OXYGEN SATURATION: 99 % | TEMPERATURE: 96.8 F | DIASTOLIC BLOOD PRESSURE: 66 MMHG | WEIGHT: 152.8 LBS | SYSTOLIC BLOOD PRESSURE: 114 MMHG | BODY MASS INDEX: 28.85 KG/M2 | HEART RATE: 91 BPM | HEIGHT: 61 IN

## 2024-07-19 DIAGNOSIS — E11.9 TYPE 2 DIABETES MELLITUS WITHOUT COMPLICATION, WITH LONG-TERM CURRENT USE OF INSULIN (HCC): ICD-10-CM

## 2024-07-19 DIAGNOSIS — Z00.00 MEDICARE ANNUAL WELLNESS VISIT, SUBSEQUENT: ICD-10-CM

## 2024-07-19 DIAGNOSIS — Z79.4 TYPE 2 DIABETES MELLITUS WITHOUT COMPLICATION, WITH LONG-TERM CURRENT USE OF INSULIN (HCC): Primary | ICD-10-CM

## 2024-07-19 DIAGNOSIS — Z12.12 SCREENING FOR COLORECTAL CANCER: ICD-10-CM

## 2024-07-19 DIAGNOSIS — Z12.11 SCREENING FOR COLORECTAL CANCER: ICD-10-CM

## 2024-07-19 DIAGNOSIS — K21.9 GASTROESOPHAGEAL REFLUX DISEASE WITHOUT ESOPHAGITIS: ICD-10-CM

## 2024-07-19 DIAGNOSIS — Z23 ENCOUNTER FOR IMMUNIZATION: ICD-10-CM

## 2024-07-19 DIAGNOSIS — J30.2 SEASONAL ALLERGIES: ICD-10-CM

## 2024-07-19 DIAGNOSIS — G47.9 SLEEP DISTURBANCE: Primary | ICD-10-CM

## 2024-07-19 DIAGNOSIS — F33.9 DEPRESSION, RECURRENT (HCC): ICD-10-CM

## 2024-07-19 DIAGNOSIS — Z79.4 TYPE 2 DIABETES MELLITUS WITHOUT COMPLICATION, WITH LONG-TERM CURRENT USE OF INSULIN (HCC): ICD-10-CM

## 2024-07-19 DIAGNOSIS — E78.2 MIXED HYPERLIPIDEMIA: ICD-10-CM

## 2024-07-19 DIAGNOSIS — Z12.31 ENCOUNTER FOR SCREENING MAMMOGRAM FOR MALIGNANT NEOPLASM OF BREAST: ICD-10-CM

## 2024-07-19 DIAGNOSIS — M81.0 AGE-RELATED OSTEOPOROSIS WITHOUT CURRENT PATHOLOGICAL FRACTURE: ICD-10-CM

## 2024-07-19 DIAGNOSIS — Z13.820 ENCOUNTER FOR SCREENING FOR OSTEOPOROSIS: ICD-10-CM

## 2024-07-19 DIAGNOSIS — G20.A1 PARKINSON'S DISEASE WITHOUT DYSKINESIA, UNSPECIFIED WHETHER MANIFESTATIONS FLUCTUATE: ICD-10-CM

## 2024-07-19 DIAGNOSIS — E11.9 TYPE 2 DIABETES MELLITUS WITHOUT COMPLICATION, WITH LONG-TERM CURRENT USE OF INSULIN (HCC): Primary | ICD-10-CM

## 2024-07-19 LAB — SL AMB POCT HEMOGLOBIN AIC: 6.7 (ref ?–6.5)

## 2024-07-19 PROCEDURE — 99214 OFFICE O/P EST MOD 30 MIN: CPT | Performed by: FAMILY MEDICINE

## 2024-07-19 PROCEDURE — 90677 PCV20 VACCINE IM: CPT

## 2024-07-19 PROCEDURE — 83036 HEMOGLOBIN GLYCOSYLATED A1C: CPT

## 2024-07-19 PROCEDURE — G0009 ADMIN PNEUMOCOCCAL VACCINE: HCPCS

## 2024-07-19 PROCEDURE — G2211 COMPLEX E/M VISIT ADD ON: HCPCS | Performed by: FAMILY MEDICINE

## 2024-07-19 RX ORDER — TIRZEPATIDE 2.5 MG/.5ML
2.5 INJECTION, SOLUTION SUBCUTANEOUS WEEKLY
Qty: 5 ML | Refills: 1 | Status: SHIPPED | OUTPATIENT
Start: 2024-07-19

## 2024-07-19 RX ORDER — TIRZEPATIDE 5 MG/.5ML
5 INJECTION, SOLUTION SUBCUTANEOUS WEEKLY
Qty: 4 ML | Refills: 0 | Status: SHIPPED | OUTPATIENT
Start: 2024-08-18

## 2024-07-19 RX ORDER — RAMELTEON 8 MG/1
8 TABLET ORAL
Qty: 90 TABLET | Refills: 1 | Status: SHIPPED | OUTPATIENT
Start: 2024-07-19 | End: 2024-07-19

## 2024-07-19 RX ORDER — RAMELTEON 8 MG/1
8 TABLET ORAL
Qty: 60 TABLET | Refills: 0 | Status: SHIPPED | OUTPATIENT
Start: 2024-07-19

## 2024-07-19 NOTE — TELEPHONE ENCOUNTER
Patient came to the office for an office visit. She received a bill from [facility] and asked to speak to me regarding the bill. After reviewing the bill she received I explained to her with an interpretor that the deductible and co pay are her responsibility.  I explained she can make payments and/or contact the billing office to set up something convenient for her. Patient states she will talk to Sarah at the  about her payments or contact the billing department. Advised patient to call me or stop by any time with any questions or concerns. Pt verbalized understanding but seemed concerned about the payment.    Thank you

## 2024-07-19 NOTE — PROGRESS NOTES
Ambulatory Visit  Name: Sylvie Rodríguez      : 1955      MRN: [id number]  Encounter Provider: Cindy Saleh MD  Encounter Date: 2024   Encounter department: [facility] to [facility]    Assessment & Plan   1. Sleep disturbance  -   Patient continues to report sleep disturbance, although PCP has concern that this is likely secondary from depression  - Given patient would like to hold off on meds for depression, will trial on ramelteon at this time   - RTO in 2 months to follow up on sleep disturbance  - ramelteon (ROZEREM) 8 mg tablet; Take 1 tablet (8 mg total) by mouth daily at bedtime  2. Depression, recurrent (HCC)  - Patient reports she is feeling down as her plans for relocation to [facility] fell through  - She denies any SI/HI at this time  - PCP discussed pharmacologic and non-pharmacologic options at this time, which she will consider and f/u with at next appt  3. Screening for colorectal cancer  -     Ambulatory referral to Gastroenterology; Future  4. Encounter for screening mammogram for malignant neoplasm of breast  -     Mammo screening bilateral w 3d & cad; Future  5. Encounter for screening for osteoporosis  -     DXA bone density spine hip and pelvis; Future; Expected date: 2024  6. Age-related osteoporosis without current pathological fracture  -     DXA bone density spine hip and pelvis; Future; Expected date: 2024  7. Type 2 diabetes mellitus without complication, with long-term current use of insulin (HCC)  -   Patient returns for f/u of A1C noted at 6.7, down from 8.5. Upon questioning patient reports she never received GLP-1 due to insurance coverage and did in fact discontinue her insulin therapy.  -  PCP then questioned how patient's A1C improved since last visit. Patient admits she bought a medication that was being sold at her [employer] for Type 2 DM. Patient does not know what the name of the medication is but will stop this immediately and  "call PCP with name  - PCP counseled patient extensively on importance of only taking medications that are prescribed by a practicing physician and is to not start any medication without first speaking to PCP.  - PCP office to reach out to pharmacy and assess which medication would be covered by insurance, for now PCP will send over Mounjaro.  - Mounjaro 2.5 MG/0.5ML; Inject 0.5 mL (2.5 mg total) under the skin once a week  -     Mounjaro 5 MG/0.5ML; Inject 0.5 mL (5 mg total) under the skin once a week Do not start before August 18, 2024.       History of Present Illness     Depression  This is a chronic problem. The current episode started more than 1 year ago. The problem has been waxing and waning.   Diabetes  She presents for her follow-up diabetic visit. She has type 2 diabetes mellitus. Her disease course has been improving (6.7). There are no hypoglycemic associated symptoms. Pertinent negatives for diabetes include no polydipsia, no polyphagia and no polyuria. There are no hypoglycemic complications. Symptoms are improving. Pertinent negatives for diabetic complications include no nephropathy. Risk factors for coronary artery disease include diabetes mellitus, sedentary lifestyle and post-menopausal. Current diabetic treatment includes oral agent (monotherapy) (Patient supposed to be on GLP-1 however reports insurance would not cover med. Hence, says she bought a medication that was being sold at her [employer]. Patient does not know what the name of the medication is but will stop this immediately. Call with name.). She is compliant with treatment all of the time.       Review of Systems   Endocrine: Negative for polydipsia, polyphagia and polyuria.   Psychiatric/Behavioral:  Positive for depression.      Pertinent Medical History         Objective     /66   Pulse 91   Temp (!) 96.8 °F (36 °C)   Ht 5' 0.6\" (1.539 m)   Wt 69.3 kg (152 lb 12.8 oz)   SpO2 99%   BMI 29.25 kg/m²     Physical " Exam  Constitutional:       Appearance: She is well-developed.   HENT:      Head: Normocephalic and atraumatic.   Eyes:      Conjunctiva/sclera: Conjunctivae normal.   Cardiovascular:      Rate and Rhythm: Normal rate and regular rhythm.      Heart sounds: Normal heart sounds.   Pulmonary:      Effort: Pulmonary effort is normal.      Breath sounds: Normal breath sounds.   Abdominal:      General: Bowel sounds are normal.      Palpations: Abdomen is soft.   Musculoskeletal:      Cervical back: Normal range of motion and neck supple.   Skin:     General: Skin is warm and dry.   Neurological:      Mental Status: She is alert. Mental status is at baseline.       Administrative Statements

## 2024-08-01 ENCOUNTER — TELEPHONE (OUTPATIENT)
Age: 69
End: 2024-08-01

## 2024-08-01 ENCOUNTER — PREP FOR PROCEDURE (OUTPATIENT)
Age: 69
End: 2024-08-01

## 2024-08-01 DIAGNOSIS — Z12.11 SCREENING FOR COLON CANCER: Primary | ICD-10-CM

## 2024-08-01 NOTE — TELEPHONE ENCOUNTER
08/01/24  Screened by: Ade Zapata    Referring Provider Teto Brooks    Pre- Screening:     There is no height or weight on file to calculate BMI.  Has patient been referred for a routine screening Colonoscopy? yes  Is the patient between 45-75 years old? yes      Previous Colonoscopy no   If yes:    Date: N/A    Facility: N/A    Reason: N/A          Does the patient want to see a Gastroenterologist prior to their procedure OR are they having any GI symptoms? no    Has the patient been hospitalized or had abdominal surgery in the past 6 months? no    Does the patient use supplemental oxygen? no    Does the patient take Coumadin, Lovenox, Plavix, Elliquis, Xarelto, or other blood thinning medication? no    Has the patient had a stroke, cardiac event, or stent placed in the past year? no      If patient is between 45yrs - 49yrs, please advise patient that we will have to confirm benefits & coverage with their insurance company for a routine screening colonoscopy.

## 2024-08-01 NOTE — TELEPHONE ENCOUNTER
Scheduled date of colonoscopy (as of today):9/20/24  Physician performing colonoscopy:   Location of colonoscopy: [place]  Bowel prep reviewed with patient: Ade Zapata  Instructions reviewed with patient by: Ade Zapata  Clearances: N/A      Please mail instructions

## 2024-08-01 NOTE — TELEPHONE ENCOUNTER
Patients GI provider:  N/A    Number to return call: [phone]    Reason for call: Pt scheduled for colonoscopy and needs miralax dulcolax diabetic prep sent in mail.    Scheduled procedure/appointment date if applicable: procedure 9/20/24

## 2024-08-07 ENCOUNTER — OFFICE VISIT (OUTPATIENT)
Dept: FAMILY MEDICINE CLINIC | Facility: CLINIC | Age: 69
End: 2024-08-07
Payer: COMMERCIAL

## 2024-08-07 VITALS
HEART RATE: 93 BPM | BODY MASS INDEX: 29.42 KG/M2 | DIASTOLIC BLOOD PRESSURE: 70 MMHG | OXYGEN SATURATION: 99 % | TEMPERATURE: 95.5 F | WEIGHT: 155.8 LBS | SYSTOLIC BLOOD PRESSURE: 108 MMHG | HEIGHT: 61 IN

## 2024-08-07 DIAGNOSIS — N39.0 URINARY TRACT INFECTION WITHOUT HEMATURIA, SITE UNSPECIFIED: Primary | ICD-10-CM

## 2024-08-07 LAB
SL AMB  POCT GLUCOSE, UA: NORMAL
SL AMB LEUKOCYTE ESTERASE,UA: NORMAL
SL AMB POCT BILIRUBIN,UA: NEGATIVE
SL AMB POCT BLOOD,UA: NEGATIVE
SL AMB POCT CLARITY,UA: CLEAR
SL AMB POCT COLOR,UA: NORMAL
SL AMB POCT KETONES,UA: NORMAL
SL AMB POCT NITRITE,UA: NEGATIVE
SL AMB POCT PH,UA: 6.5
SL AMB POCT SPECIFIC GRAVITY,UA: 1.02
SL AMB POCT URINE PROTEIN: NEGATIVE
SL AMB POCT UROBILINOGEN: NORMAL

## 2024-08-07 PROCEDURE — 81002 URINALYSIS NONAUTO W/O SCOPE: CPT | Performed by: PHYSICIAN ASSISTANT

## 2024-08-07 PROCEDURE — G2211 COMPLEX E/M VISIT ADD ON: HCPCS | Performed by: PHYSICIAN ASSISTANT

## 2024-08-07 PROCEDURE — 99214 OFFICE O/P EST MOD 30 MIN: CPT | Performed by: PHYSICIAN ASSISTANT

## 2024-08-07 RX ORDER — ENALAPRIL MALEATE 2.5 MG/1
2.5 TABLET ORAL DAILY
COMMUNITY

## 2024-08-07 RX ORDER — CIPROFLOXACIN 500 MG/1
500 TABLET, FILM COATED ORAL EVERY 12 HOURS SCHEDULED
Qty: 10 TABLET | Refills: 0 | Status: SHIPPED | OUTPATIENT
Start: 2024-08-07 | End: 2024-08-12

## 2024-08-07 NOTE — PROGRESS NOTES
Ambulatory Visit  Name: Sylvie Rodríguez      : 1955      MRN: [id number]  Encounter Provider: Reece Lam PA-C  Encounter Date: 2024   Encounter department: [facility]    Assessment & Plan   1. Urinary tract infection without hematuria, site unspecified  -     ciprofloxacin (CIPRO) 500 mg tablet; Take 1 tablet (500 mg total) by mouth every 12 (twelve) hours for 5 days  -     POCT urine dip  -     Urinalysis with microscopic; Future  -     Urine culture; Future  -     Urinalysis with microscopic  -     Urine culture  - 1 day episode of dysuria, flank pain, urinary frequency. Urine Dip negative, PCP to send urine for microscopy and culture. Empiric treatment for urinary tract infection prescribed.        History of Present Illness     Urinary Tract Infection   This is a new problem. The current episode started today. The problem occurs every urination. The problem has been unchanged. The quality of the pain is described as burning. The pain is at a severity of 6/10. The patient is experiencing no pain. There has been no fever. She is Not sexually active. There is No history of pyelonephritis. Associated symptoms include flank pain. Pertinent negatives include no chills, hematuria, nausea or vomiting. She has tried nothing for the symptoms. Her past medical history is significant for recurrent UTIs.       Review of Systems   Constitutional:  Negative for chills and fever.   HENT:  Negative for ear pain and sore throat.    Eyes:  Negative for pain and visual disturbance.   Respiratory:  Negative for cough and shortness of breath.    Cardiovascular:  Negative for chest pain and palpitations.   Gastrointestinal:  Negative for abdominal pain, nausea and vomiting.   Genitourinary:  Positive for dysuria and flank pain. Negative for hematuria.   Musculoskeletal:  Negative for arthralgias and back pain.   Skin:  Negative for color change and rash.   Neurological:  Negative for seizures  "and syncope.   All other systems reviewed and are negative.    Medical History Reviewed by provider this encounter:       Objective     /70 (BP Location: Left arm)   Pulse 93   Temp (!) 95.5 °F (35.3 °C) (Tympanic)   Ht 5' 0.6\" (1.539 m)   Wt 70.7 kg (155 lb 12.8 oz)   SpO2 99%   BMI 29.83 kg/m²     Physical Exam  Constitutional:       Appearance: Normal appearance.   HENT:      Head: Normocephalic.      Right Ear: External ear normal.      Left Ear: External ear normal.      Nose: Nose normal.      Mouth/Throat:      Mouth: Mucous membranes are moist.      Pharynx: Oropharynx is clear.   Eyes:      Conjunctiva/sclera: Conjunctivae normal.   Cardiovascular:      Rate and Rhythm: Normal rate and regular rhythm.      Heart sounds: Normal heart sounds.   Pulmonary:      Effort: Pulmonary effort is normal.      Breath sounds: Normal breath sounds.   Abdominal:      General: Bowel sounds are normal.      Palpations: Abdomen is soft.      Tenderness: There is right CVA tenderness and left CVA tenderness.   Neurological:      Mental Status: She is alert and oriented to person, place, and time.   Psychiatric:         Behavior: Behavior normal.       Administrative Statements   "

## 2024-08-12 ENCOUNTER — TELEPHONE (OUTPATIENT)
Age: 69
End: 2024-08-12

## 2024-08-12 ENCOUNTER — TELEPHONE (OUTPATIENT)
Dept: FAMILY MEDICINE CLINIC | Facility: CLINIC | Age: 69
End: 2024-08-12

## 2024-08-12 NOTE — TELEPHONE ENCOUNTER
Lab called    They have not received the patient's specimen. It will be sent with the  today per staff.

## 2024-08-12 NOTE — TELEPHONE ENCOUNTER
[facility] called stating they never received the urine specimen for this patient from 8/7.     Confirmed with the office the specimen was not left for the  to . They said they would do this today 8/12.     The lab advised they cannot send the specimen at this time because it is too late. The urine cannot be more than 3 days.     Advised the office of this. They will contact the patient to give another specimen.

## 2024-08-15 RX ORDER — ENALAPRIL MALEATE 2.5 MG/1
2.5 TABLET ORAL DAILY
Qty: 100 TABLET | OUTPATIENT
Start: 2024-08-15

## 2024-08-15 RX ORDER — LEVOTHYROXINE SODIUM 75 UG/1
75 TABLET ORAL DAILY
Qty: 90 TABLET | OUTPATIENT
Start: 2024-08-15

## 2024-08-16 NOTE — TELEPHONE ENCOUNTER
Hi Team,    We have been contacted by the pharmacy to refill your prescription. We want to provide the very best care we can to our patients, and it requires we check in regarding refills in between your regularly scheduled visits. We realize you may not be aware your pharmacy has reached out to us or you may no longer need this medication. Because of this and other reasons, our office policy is to have the patient contact the office via MY Chart with a message or give us a call at [phone] to have us send over a refill.    Thank you for understanding. Your care is important to us.    Kindly,    [facility]

## 2024-08-20 RX ORDER — LEVOTHYROXINE SODIUM 75 UG/1
75 TABLET ORAL DAILY
Qty: 90 TABLET | OUTPATIENT
Start: 2024-08-20

## 2024-08-21 DIAGNOSIS — E11.9 TYPE 2 DIABETES MELLITUS WITHOUT COMPLICATION, WITH LONG-TERM CURRENT USE OF INSULIN (HCC): ICD-10-CM

## 2024-08-21 DIAGNOSIS — Z79.4 TYPE 2 DIABETES MELLITUS WITHOUT COMPLICATION, WITH LONG-TERM CURRENT USE OF INSULIN (HCC): ICD-10-CM

## 2024-08-21 RX ORDER — LEVOTHYROXINE SODIUM 75 UG/1
75 TABLET ORAL DAILY
Status: CANCELLED | OUTPATIENT
Start: 2024-08-21

## 2024-08-21 RX ORDER — LEVOTHYROXINE SODIUM 75 UG/1
75 TABLET ORAL DAILY
COMMUNITY
End: 2024-08-22 | Stop reason: SDUPTHER

## 2024-08-21 NOTE — TELEPHONE ENCOUNTER
I called Patient to see if she needed mounjaro refill.  Patient did say that she also needs her cipro  States she never got it.  Patient also said that she needs a levothyroxine refill.  Upon looking at her med list I did not see medication Levothyroxine on current list.  I stated that I would review her chart and call her back.   # [id number].        Upon review of chart there was an order for Levothyroxine.that was deleted on 7/18/2024 .    I was able to speak with Betsy HERNANDEZ) and she was able to inform me that Patient was to move to [place] and PCP printed out a paper script.      Patient did not move to [place] and then came in for a visit.  Upon review of meds patient did not state that she was taking and it was not added on to list.      Can patient have mounjaro and levothyroxine refill.      I called the Pharmacy and Pharmacy did confirm that Cipro was picked up.

## 2024-08-22 DIAGNOSIS — Z79.4 TYPE 2 DIABETES MELLITUS WITHOUT COMPLICATION, WITH LONG-TERM CURRENT USE OF INSULIN (HCC): ICD-10-CM

## 2024-08-22 DIAGNOSIS — E11.9 TYPE 2 DIABETES MELLITUS WITHOUT COMPLICATION, WITH LONG-TERM CURRENT USE OF INSULIN (HCC): ICD-10-CM

## 2024-08-22 RX ORDER — TIRZEPATIDE 2.5 MG/.5ML
2.5 INJECTION, SOLUTION SUBCUTANEOUS WEEKLY
Qty: 5 ML | Refills: 1 | Status: SHIPPED | OUTPATIENT
Start: 2024-08-22

## 2024-08-22 RX ORDER — LEVOTHYROXINE SODIUM 75 UG/1
75 TABLET ORAL DAILY
Qty: 90 TABLET | Refills: 1 | Status: SHIPPED | OUTPATIENT
Start: 2024-08-22

## 2024-08-23 RX ORDER — TIRZEPATIDE 5 MG/.5ML
5 INJECTION, SOLUTION SUBCUTANEOUS WEEKLY
Qty: 4 ML | Refills: 0 | OUTPATIENT
Start: 2024-08-23

## 2024-09-06 ENCOUNTER — TELEPHONE (OUTPATIENT)
Dept: GASTROENTEROLOGY | Facility: CLINIC | Age: 69
End: 2024-09-06

## 2024-09-06 NOTE — TELEPHONE ENCOUNTER
Mailed Miralax/Dulcolax prep instructions to her home and hand wrote the instructions to hold Mounjaro for 7 days prior to procedure and do not take morning of. Wrote in Pashto: No tome sachin medicamento mary 7 días antes del procedimiento o el mismo día del procedimiento.

## 2024-09-13 ENCOUNTER — OFFICE VISIT (OUTPATIENT)
Dept: FAMILY MEDICINE CLINIC | Facility: CLINIC | Age: 69
End: 2024-09-13
Payer: COMMERCIAL

## 2024-09-13 VITALS
DIASTOLIC BLOOD PRESSURE: 64 MMHG | HEIGHT: 61 IN | BODY MASS INDEX: 28.13 KG/M2 | TEMPERATURE: 96.5 F | HEART RATE: 86 BPM | WEIGHT: 149 LBS | OXYGEN SATURATION: 98 % | SYSTOLIC BLOOD PRESSURE: 110 MMHG

## 2024-09-13 DIAGNOSIS — G47.9 SLEEP DISTURBANCE: ICD-10-CM

## 2024-09-13 DIAGNOSIS — M54.50 ACUTE BILATERAL LOW BACK PAIN WITHOUT SCIATICA: Primary | ICD-10-CM

## 2024-09-13 PROCEDURE — G2211 COMPLEX E/M VISIT ADD ON: HCPCS | Performed by: FAMILY MEDICINE

## 2024-09-13 PROCEDURE — 99214 OFFICE O/P EST MOD 30 MIN: CPT | Performed by: FAMILY MEDICINE

## 2024-09-13 RX ORDER — FAMOTIDINE 40 MG/1
40 TABLET, FILM COATED ORAL DAILY
COMMUNITY

## 2024-09-13 RX ORDER — EMPAGLIFLOZIN, METFORMIN HYDROCHLORIDE 5; 1000 MG/1; MG/1
TABLET, EXTENDED RELEASE ORAL
COMMUNITY
Start: 2024-08-14

## 2024-09-13 RX ORDER — OXYBUTYNIN CHLORIDE 5 MG/1
5 TABLET ORAL 2 TIMES DAILY
COMMUNITY
Start: 2024-08-22

## 2024-09-13 RX ORDER — CYCLOBENZAPRINE HCL 5 MG
5 TABLET ORAL
Qty: 15 TABLET | Refills: 0 | Status: SHIPPED | OUTPATIENT
Start: 2024-09-13

## 2024-09-13 RX ORDER — RAMELTEON 8 MG/1
8 TABLET ORAL
Qty: 90 TABLET | Refills: 1 | Status: SHIPPED | OUTPATIENT
Start: 2024-09-13

## 2024-09-13 RX ORDER — ESTRADIOL 0.1 MG/G
CREAM VAGINAL
COMMUNITY
Start: 2024-08-28

## 2024-09-13 NOTE — PROGRESS NOTES
Ambulatory Visit  Name: Sylvie Rodríguez      : 1955      MRN: [id number]  Encounter Provider: Cindy Saleh MD  Encounter Date: 2024   Encounter department: [facility]    Assessment & Plan  Acute bilateral low back pain without sciatica  Patient reporting progressive bilateral lumbar back pain over the last 3 months  -Patient describes the pain as aching and has experienced muscle spasms  -Patient to trial on muscle relaxant and lumbar back exercises and RTO in 3 months for follow-up  Orders:    cyclobenzaprine (FLEXERIL) 5 mg tablet; Take 1 tablet (5 mg total) by mouth daily at bedtime    Sleep disturbance  -Patient reports that since initiating ramelteon x 2 months ago she had been noticing improvement in sleep  -She reports was sleeping about 6 to 7 hours  -Patient to continue on ramelteon and notify PCP if any side effects  Orders:    ramelteon (ROZEREM) 8 mg tablet; Take 1 tablet (8 mg total) by mouth daily at bedtime       History of Present Illness     Patient returns for 2-month follow-up of sleep disturbance  PCP had started patient on ramelteon which she reports has significantly improved sleep quality  However, patient reports that over the last 3 weeks she has noted nocturnal coughing right when she lays down  She denies any sensation of nasal drainage or reflux sensation  Patient does however report that she believes the cough is secondary to anxiety as in the last 3 weeks she has a grandson living with her and it has been a bit of an adjustment    Back Pain  This is a chronic problem. The current episode started more than 1 month ago (3 months). The problem occurs constantly. The problem is unchanged. The pain is present in the lumbar spine. The quality of the pain is described as aching. The pain does not radiate. The pain is moderate. The symptoms are aggravated by lying down and standing. Pertinent negatives include no paresis, paresthesias or perianal  "numbness. She has tried nothing for the symptoms.       History obtained from : patient  Review of Systems   Musculoskeletal:  Positive for back pain.   Neurological:  Negative for paresthesias.         Objective     /64   Pulse 86   Temp (!) 96.5 °F (35.8 °C) (Tympanic)   Ht 5' 0.5\" (1.537 m)   Wt 67.6 kg (149 lb)   SpO2 98%   BMI 28.62 kg/m²     Physical Exam  Constitutional:       Appearance: She is well-developed.   HENT:      Head: Normocephalic and atraumatic.   Eyes:      Conjunctiva/sclera: Conjunctivae normal.   Cardiovascular:      Rate and Rhythm: Normal rate and regular rhythm.      Heart sounds: Normal heart sounds.   Pulmonary:      Effort: Pulmonary effort is normal.      Breath sounds: Normal breath sounds.   Musculoskeletal:      Cervical back: Normal range of motion and neck supple.   Neurological:      Mental Status: She is alert. Mental status is at baseline.         "

## 2024-09-13 NOTE — PATIENT INSTRUCTIONS
"Patient Education     Ejercicios para la espalda   Conceptos Básicos   Redactado por los médicos y editores de UpToDate   ¿Por qué necesito hacer ejercicios para la espalda? -- Los ejercicios para la espalda pueden ayudar a aliviar el dolor de espalda y podrían ayudar a prevenir kasia de espalda futuros. A joesph plazo, es importante fortalecer los músculos de la parte baja de la espalda, las nalgas y el área del estómago. Estos son rafael \"músculos troncales\". Los ejercicios de estiramiento también son importantes para mantener los músculos flexibles.  A continuación se muestran algunos ejercicios de estiramiento y fortalecimiento que podrían ayudarle. Otras formas de movimiento también pueden ayudar a aliviar o prevenir el dolor de espalda. Por ejemplo, a algunas personas les gusta caminar, hacer ejercicio aeróbico o hacer yoga o arelis chi. Lo más importante es  el cuerpo. Tai médico, enfermero o fisioterapeuta puede ayudarle a encontrar diferentes tipos de actividad que le chad juan m.  ¿Qué ejercicios de estiramiento reyna hacer? -- A continuación se muestran algunos ejemplos de ejercicios de estiramiento. Gravelly rafael músculos antes de estirar para ayudar a prevenir lesiones. Para calentar, puede caminar, trotar o andar en bicicleta mary unos minutos.  Comience repitiendo cada jack de estos estiramientos de 2 a 3 veces. Intente mantener cada estiramiento mary 5 a 10 segundos y trate de hacer los estiramientos 2 a 3 veces al día. Respire lenta y profundamente mientras hace los ejercicios. Nunca rebote al hacer estiramientos.   Estiramiento kenia-bernabe (figura 1) - Comience a cuatro patas en el suelo, con las aline debajo de los hombros, las rodillas debajo de las caderas y la espalda plana. Marlon, meta la barbilla y contraiga los músculos del estómago mientras curva la espalda (dev un \"kenia\"). Mantenga el estiramiento mary unos 10 segundos. Descanse unos segundos mientras aplana la espalda. Luego, levante " "la barbilla y deje que el vientre y la espalda baja se hundan hacia el suelo (dev ollie \"bernabe\"). Mantenga el estiramiento mary unos 10 segundos.   Estiramientos simples de rodilla a pecho (figura 2) ? Mientras se acuesta boca arriba, flexione las rodillas con los pies apoyados en el suelo. Tire de ollie rodilla hacia el pecho hasta que sienta un estiramiento en la parte baja de la espalda y en el área de los glúteos. Baje y repita con la otra rodilla. Si tiene problemas de rodilla, levante la rodilla agarrando la parte posterior del muslo en lugar de la parte delantera de la rodilla. También puede hacer sachin ejercicio agarrando ambas rodillas al mismo tiempo.   Rotaciones del tronco inferior (figura 3) ? Mientras se acuesta boca arriba, flexione las rodillas con los pies apoyados en el suelo. Mantenga las rodillas y los tobillos juntos y luego déjelos caer hacia un lado. Mantenga ambos hombros tocando el suelo hasta que sienta un estiramiento en los músculos laterales de la espalda. Repita en el otro lado.   Estiramientos de la espalda baja sentado (figura 4) - Siéntese en ollie silla con los pies separados aproximadamente al ancho de los hombros. Luego, inclínese hacia adelante hasta que sienta un estiramiento en la ian lumbar.  ¿Qué ejercicios de fortalecimiento reyna hacer? -- A continuación se muestran algunos ejemplos de ejercicios de fortalecimiento.  Empiece por hacer cada ejercicio 2 o 3 veces. Trabaje hasta hacer cada ejercicio 10 veces. Mantenga cada ejercicio mary 3 a 5 segundos e intente realizarlos 2 a 3 veces al día. Lopez todos los ejercicios lentamente.   Apretones del omóplato (figura 5) - Junte los omóplatos en la parte superior de la espalda y manténgalo así mary 3 a 5 segundos. También puede hacer estos 1 lado a la vez. Siéntese con buena postura y asegúrese de que rafael hombros no se levanten cuando lopez sachin ejercicio. Relaje.   Inclinaciones pélvicas (figura 6) ? Acuéstese boca arriba con las " "rodillas flexionadas y los pies apoyados en el suelo. Apriete los músculos del abdomen y presione la parte baja de la espalda hacia el suelo. Relaje. Debería poder respirar cómodamente mary sachin ejercicio.   Levantamientos de cadera (figura 7) ? Acuéstese boca arriba con las rodillas flexionadas y los pies apoyados en el suelo. Apriete los músculos del abdomen, mantenga la espalda plana y levante las nalgas del suelo. Relaje. Debería sentir esto en rafael nalgas, no en nguyen espalda baja.  ¿Qué más reyna saber?    El ejercicio, incluido el estiramiento, puede resultar un poco incómodo, leopoldo no debe sentir dolor henrry o intenso. Si siente un dolor intenso, deje de hacer lo que está haciendo. Si el dolor intenso continúa, llame a nguyen médico o enfermero.   No contenga la respiración cuando lopez ejercicio. Si tiende a contener la respiración, intente contar en voz aissatou cuando lopez ejercicio. Si algún ejercicio le molesta, deténgase de inmediato.   Siempre entre en calor antes de hacer ejercicio. Los músculos calientes se estiran mucho más fácilmente que los músculos fríos. Estirar los músculos fríos puede provocar lesiones.   Hacer ejercicios antes de ollie comida puede ser ollie buena forma de adoptar ollie rutina.  Todos los artículos se actualizan a medida que se descubre nueva evidencia y culmina nuestro proceso de evaluación por homólogos   Sachin artículo se recuperó de UpToDate el: Apr 03, 2024.  Artículo [id number] Versión 2.0.es-419.1  Release: 32.2.4 - C32.92  © 2024 UpToDate, Inc. Todos los derechos reservados.  figura 1: Estiramiento kenia-bernabe     Comience a cuatro patas en el suelo, con las aline debajo de los hombros, las rodillas debajo de las caderas y la espalda plana. Marlon, meta la barbilla y contraiga los músculos del estómago mientras curva la espalda (dev un \"kenia\"). Mantenga el estiramiento mary unos 10 segundos. Descanse unos segundos mientras aplana la espalda. Luego, levante la barbilla y deje que el " "vientre y la espalda baja se hundan hacia el suelo (dev ollie \"bernabe\"). Mantenga el estiramiento mary unos 10 segundos.  Gráfico [id number] Versión 1.0  figura 2: Estiramiento simple de rodilla a pecho     Acuéstese boca arriba con las rodillas dobladas y apoye los pies en el suelo. Tire de ollie rodilla hacia el pecho hasta que sienta un estiramiento en la parte baja de la espalda y en el área de los glúteos. Repita con la otra rodilla. Si tiene problemas de rodilla, levante la rodilla agarrando la parte posterior del muslo en lugar de la parte delantera de la rodilla. También puede hacer sachin ejercicio agarrando ambas rodillas al mismo tiempo.  Gráfico [id number] Versión 1.0  figura 3: Rotación del tronco inferior     Mientras se acuesta boca arriba, doble las rodillas y apoye los pies en el suelo. Mantenga las piernas juntas y luego déjelas caer hacia un lado. Mantenga ambos hombros tocando el suelo hasta que sienta un estiramiento en los músculos laterales de la espalda. Repita en el otro lado.  Gráfico [id number] Versión 1.0  figura 4: Estiramiento de la espalda baja     Siéntese en ollie silla con los pies separados aproximadamente al ancho de los hombros. Luego, inclínese hacia adelante hasta que sienta un estiramiento en la ian lumbar.  Gráfico [id number] Versión 1.0  figura 5: Apretones del omóplato     Junte los omóplatos en la parte superior de la espalda y manténgalo así mary 3 a 5 segundos. Asegúrese de estar sentado con ollie buena postura y de que rafael hombros no se levanten cuando lopez sachin ejercicio. Relaje.  Gráfico [id number] Versión 1.0  figura 6: Inclinaciones pélvicas     Acuéstese boca arriba con las rodillas dobladas y los pies apoyados en el suelo. Apriete los músculos del estómago y presione la parte baja de la espalda hacia el suelo. Relaje.  Gráfico [id number] Versión 1.0  figura 7: Levantamientos de cadera     Acuéstese boca arriba con las rodillas dobladas y los pies apoyados en el suelo. Apriete los músculos " del estómago y levante las nalgas del suelo. Relaje.  Gráfico [id number] Versión 1.0  Exención de responsabilidad y uso de la información del consumidor   Descargo de responsabilidad: esta información generalizada es un resumen limitado de información sobre el diagnóstico, el tratamiento y/o los medicamentos. No pretende ser exhaustiva y se debe utilizar dev herramienta para ayudar al usuario a comprender y/o evaluar las posibles opciones de diagnóstico y tratamiento. No incluye toda la información sobre afecciones, tratamientos, medicamentos, efectos secundarios o riesgos puedan ser aplicables a un paciente específico. No tiene el propósito de servir dev recomendación médica ni de sustituir la recomendación médica, el diagnóstico o el tratamiento de un profesional de atención médica que se base en el examen y la evaluación de sachin profesional de la carmina respecto a las circunstancias específicas y únicas del paciente. Los pacientes deben hablar con un profesional de atención médica para obtener información completa sobre nguyen carmina, cuestiones médicas y opciones de tratamiento, incluidos los riesgos o los beneficios relacionados con el uso de medicamentos. Esta información no certifica que los tratamientos o medicamentos keith seguros, eficaces o estén aprobados para tratar a un paciente específico. UpToDate, Inc. y rafael afiliados renuncian a cualquier garantía o responsabilidad relacionada con esta información o el uso de la misma.El uso de esta información está sujeto a las Condiciones de uso, disponibles en https://www.[employer] Exchange[employer].com/en/know/clinical-effectiveness-terms. 2024© [employer] y rafael afiliados y/o licenciantes. Todos los derechos reservados.  Copyright   © 2024 UpToDate, Inc. Todos los derechos reservados.

## 2024-09-20 ENCOUNTER — HOSPITAL ENCOUNTER (OUTPATIENT)
Dept: PERIOP | Facility: HOSPITAL | Age: 69
Setting detail: OUTPATIENT SURGERY
End: 2024-09-20
Attending: INTERNAL MEDICINE
Payer: COMMERCIAL

## 2024-09-20 ENCOUNTER — ANESTHESIA EVENT (OUTPATIENT)
Dept: PERIOP | Facility: HOSPITAL | Age: 69
End: 2024-09-20
Payer: COMMERCIAL

## 2024-09-20 ENCOUNTER — ANESTHESIA (OUTPATIENT)
Dept: PERIOP | Facility: HOSPITAL | Age: 69
End: 2024-09-20
Payer: COMMERCIAL

## 2024-09-20 VITALS
BODY MASS INDEX: 28.13 KG/M2 | RESPIRATION RATE: 18 BRPM | DIASTOLIC BLOOD PRESSURE: 73 MMHG | HEIGHT: 61 IN | HEART RATE: 88 BPM | WEIGHT: 149 LBS | TEMPERATURE: 98.6 F | OXYGEN SATURATION: 98 % | SYSTOLIC BLOOD PRESSURE: 135 MMHG

## 2024-09-20 DIAGNOSIS — Z12.11 SCREENING FOR COLON CANCER: ICD-10-CM

## 2024-09-20 LAB — GLUCOSE SERPL-MCNC: 112 MG/DL (ref 65–140)

## 2024-09-20 PROCEDURE — 88305 TISSUE EXAM BY PATHOLOGIST: CPT | Performed by: PATHOLOGY

## 2024-09-20 PROCEDURE — 45385 COLONOSCOPY W/LESION REMOVAL: CPT | Performed by: STUDENT IN AN ORGANIZED HEALTH CARE EDUCATION/TRAINING PROGRAM

## 2024-09-20 PROCEDURE — 82948 REAGENT STRIP/BLOOD GLUCOSE: CPT

## 2024-09-20 RX ORDER — PROPOFOL 10 MG/ML
INJECTION, EMULSION INTRAVENOUS AS NEEDED
Status: DISCONTINUED | OUTPATIENT
Start: 2024-09-20 | End: 2024-09-20

## 2024-09-20 RX ORDER — GLYCOPYRROLATE 0.2 MG/ML
INJECTION INTRAMUSCULAR; INTRAVENOUS AS NEEDED
Status: DISCONTINUED | OUTPATIENT
Start: 2024-09-20 | End: 2024-09-20

## 2024-09-20 RX ORDER — SODIUM CHLORIDE, SODIUM LACTATE, POTASSIUM CHLORIDE, CALCIUM CHLORIDE 600; 310; 30; 20 MG/100ML; MG/100ML; MG/100ML; MG/100ML
INJECTION, SOLUTION INTRAVENOUS CONTINUOUS PRN
Status: DISCONTINUED | OUTPATIENT
Start: 2024-09-20 | End: 2024-09-20

## 2024-09-20 RX ORDER — SODIUM CHLORIDE, SODIUM LACTATE, POTASSIUM CHLORIDE, CALCIUM CHLORIDE 600; 310; 30; 20 MG/100ML; MG/100ML; MG/100ML; MG/100ML
20 INJECTION, SOLUTION INTRAVENOUS CONTINUOUS
Status: DISCONTINUED | OUTPATIENT
Start: 2024-09-20 | End: 2024-09-24 | Stop reason: HOSPADM

## 2024-09-20 RX ADMIN — PROPOFOL 50 MG: 10 INJECTION, EMULSION INTRAVENOUS at 14:32

## 2024-09-20 RX ADMIN — PROPOFOL 50 MG: 10 INJECTION, EMULSION INTRAVENOUS at 14:26

## 2024-09-20 RX ADMIN — GLYCOPYRROLATE 0.2 MG: 0.2 INJECTION INTRAMUSCULAR; INTRAVENOUS at 14:23

## 2024-09-20 RX ADMIN — PROPOFOL 100 MG: 10 INJECTION, EMULSION INTRAVENOUS at 14:23

## 2024-09-20 RX ADMIN — PROPOFOL 50 MG: 10 INJECTION, EMULSION INTRAVENOUS at 14:28

## 2024-09-20 RX ADMIN — SODIUM CHLORIDE, SODIUM LACTATE, POTASSIUM CHLORIDE, AND CALCIUM CHLORIDE: .6; .31; .03; .02 INJECTION, SOLUTION INTRAVENOUS at 14:23

## 2024-09-20 NOTE — H&P
[facility]  History & Physical     PATIENT INFO     Name: Sylvie Rodríguez  YOB: 1955   Age: 69 y.o.   Sex: female   MRN: [id number]     HISTORY OF PRESENT ILLNESS     Sylvie Rodríguez is a 69 y.o. year old female who presents for [place] colonoscopy.  No antithrombotics or anticoagulants.     REVIEW OF SYSTEMS     Per the HPI, and otherwise unremarkable.    Historical Information   Past Medical History:   Diagnosis Date    Anxiety     Bowel incontinence     Diabetes mellitus (HCC)     Hypothyroidism     Parkinson's disease     Vertigo      Past Surgical History:   Procedure Laterality Date    MYOMECTOMY      SHOULDER SURGERY Left     UTERINE FIBROID SURGERY       Social History   Social History     Substance and Sexual Activity   Alcohol Use Never     Social History     Substance and Sexual Activity   Drug Use Never     Social History     Tobacco Use   Smoking Status Never    Passive exposure: Never   Smokeless Tobacco Never     History reviewed. No pertinent family history.     MEDICATIONS & ALLERGIES     Current Outpatient Medications   Medication Instructions    Alcohol Swabs (Pharmacist Choice Alcohol) PADS Use as directed    ammonium lactate (LAC-HYDRIN) 12 % cream APPLY HASTA AFFECTED AREA DAILY IGLESIA LAS INSTRUCCIONES APPLY TO AFFECTED AREA DAILY AS DIRECTED.    carbidopa-levodopa (SINEMET)  mg per tablet 1 tablet, Oral, 2 times daily    cyclobenzaprine (FLEXERIL) 5 mg, Oral, Daily at bedtime    enalapril (VASOTEC) 2.5 mg, Oral, Daily    estradiol (ESTRACE) 0.1 mg/g vaginal cream INSERT 2 G INTO THE VAGINA 2 (TWO) TIMES A WEEK.    famotidine (PEPCID) 40 mg, Oral, Daily    glucose blood (OneTouch Verio) test strip USE IGLESIA INDICADO DOS VECES AL DIAUSE AS DIRECTED TWICE DAILY    levothyroxine 75 mcg, Oral, Daily    Mounjaro 2.5 mg, Subcutaneous, Weekly    oxybutynin (DITROPAN) 5 mg, Oral, 2 times daily    ramelteon (ROZEREM) 8 mg, Oral, Daily at bedtime     "Synjardy XR 5-1000 MG TB24 TAKE 2 TABLET,IMMED AND EXTEND REL BIPHASE 24HR ORALLY EVERY MORNING    TRUEplus Lancets 30G MISC USE IGLESIA INDICADOUSE AS DIRECTED     Allergies   Allergen Reactions    Coffea Arabica Sneezing        PHYSICAL EXAM      Objective   Blood pressure 125/71, pulse 91, temperature (!) 97.3 °F (36.3 °C), temperature source Tympanic, resp. rate 18, height 5' 0.5\" (1.537 m), weight 67.6 kg (149 lb), SpO2 98%. Body mass index is 28.62 kg/m².    General Appearance:   Alert, cooperative, no distress   Lungs:   Equal chest rise, respirations unlabored    Heart:   Regular rate and rhythm   Abdomen:   Soft, non-tender, non-distended; normal bowel sounds; no masses, no organomegaly    Extremities:   No edema       ASSESSMENT & PLAN     This is a 69 y.o. year old female here for colonoscopy, and she is stable and optimized for her procedure.      Johnathan Fournier D.O.  [facility]  Division of Gastroenterology & Hepatology  Available on TigerText  [email or web address]    ** Please Note: This note is constructed using a voice recognition dictation system. **  "

## 2024-09-20 NOTE — ANESTHESIA PREPROCEDURE EVALUATION
Procedure:  COLONOSCOPY    Relevant Problems   ENDO   (+) Acquired hypothyroidism   (+) Type 2 diabetes mellitus without complication, with long-term current use of insulin (HCC)      GI/HEPATIC   (+) Gastroesophageal reflux disease without esophagitis      NEURO/PSYCH   (+) Depression, recurrent (HCC)      Held synjardy 2 days  Hold Mounjaro > 1 week    Physical Exam    Airway    Mallampati score: II  TM Distance: >3 FB  Neck ROM: full     Dental       Cardiovascular  Cardiovascular exam normal    Pulmonary  Pulmonary exam normal     Other Findings  post-pubertal.      Anesthesia Plan  ASA Score- 3     Anesthesia Type- IV sedation with anesthesia with ASA Monitors.         Additional Monitors:     Airway Plan:            Plan Factors-    Chart reviewed.   Existing labs reviewed. Patient summary reviewed.                  Induction- intravenous.    Postoperative Plan-         Informed Consent- Anesthetic plan and risks discussed with patient.  I personally reviewed this patient with the CRNA. Discussed and agreed on the Anesthesia Plan with the CRNA..

## 2024-09-20 NOTE — ANESTHESIA POSTPROCEDURE EVALUATION
Post-Op Assessment Note    CV Status:  Stable  Pain Score: 0    Pain management: adequate       Mental Status:  Alert and awake   Hydration Status:  Euvolemic   PONV Controlled:  Controlled   Airway Patency:  Patent     Post Op Vitals Reviewed: Yes    No anethesia notable event occurred.    Staff: CRNA               BP   136/70   Temp 97   Pulse 100   Resp 12   SpO2 99

## 2024-09-24 PROCEDURE — 88305 TISSUE EXAM BY PATHOLOGIST: CPT | Performed by: PATHOLOGY

## 2024-10-07 ENCOUNTER — OFFICE VISIT (OUTPATIENT)
Dept: FAMILY MEDICINE CLINIC | Facility: CLINIC | Age: 69
End: 2024-10-07
Payer: COMMERCIAL

## 2024-10-07 VITALS
SYSTOLIC BLOOD PRESSURE: 108 MMHG | OXYGEN SATURATION: 98 % | WEIGHT: 150.2 LBS | BODY MASS INDEX: 28.85 KG/M2 | HEART RATE: 88 BPM | DIASTOLIC BLOOD PRESSURE: 70 MMHG | TEMPERATURE: 96.5 F

## 2024-10-07 DIAGNOSIS — I10 PRIMARY HYPERTENSION: ICD-10-CM

## 2024-10-07 DIAGNOSIS — M54.50 ACUTE BILATERAL LOW BACK PAIN WITHOUT SCIATICA: ICD-10-CM

## 2024-10-07 DIAGNOSIS — G47.9 SLEEP DISTURBANCE: ICD-10-CM

## 2024-10-07 DIAGNOSIS — N39.3 STRESS INCONTINENCE OF URINE: ICD-10-CM

## 2024-10-07 DIAGNOSIS — Z78.0 MENOPAUSE: ICD-10-CM

## 2024-10-07 DIAGNOSIS — E78.5 DYSLIPIDEMIA: ICD-10-CM

## 2024-10-07 DIAGNOSIS — E11.9 TYPE 2 DIABETES MELLITUS WITHOUT COMPLICATION, WITH LONG-TERM CURRENT USE OF INSULIN (HCC): ICD-10-CM

## 2024-10-07 DIAGNOSIS — Z79.4 TYPE 2 DIABETES MELLITUS WITHOUT COMPLICATION, WITH LONG-TERM CURRENT USE OF INSULIN (HCC): ICD-10-CM

## 2024-10-07 DIAGNOSIS — K21.9 GASTROESOPHAGEAL REFLUX DISEASE WITHOUT ESOPHAGITIS: ICD-10-CM

## 2024-10-07 DIAGNOSIS — N39.0 URINARY TRACT INFECTION WITHOUT HEMATURIA, SITE UNSPECIFIED: Primary | ICD-10-CM

## 2024-10-07 LAB
SL AMB  POCT GLUCOSE, UA: NORMAL
SL AMB LEUKOCYTE ESTERASE,UA: NORMAL
SL AMB POCT BILIRUBIN,UA: NEGATIVE
SL AMB POCT BLOOD,UA: NORMAL
SL AMB POCT CLARITY,UA: NORMAL
SL AMB POCT COLOR,UA: YELLOW
SL AMB POCT KETONES,UA: NEGATIVE
SL AMB POCT NITRITE,UA: POSITIVE
SL AMB POCT PH,UA: 7
SL AMB POCT SPECIFIC GRAVITY,UA: 1.01
SL AMB POCT URINE PROTEIN: NEGATIVE
SL AMB POCT UROBILINOGEN: 0.2

## 2024-10-07 PROCEDURE — 87077 CULTURE AEROBIC IDENTIFY: CPT | Performed by: PHYSICIAN ASSISTANT

## 2024-10-07 PROCEDURE — 99214 OFFICE O/P EST MOD 30 MIN: CPT | Performed by: PHYSICIAN ASSISTANT

## 2024-10-07 PROCEDURE — 81002 URINALYSIS NONAUTO W/O SCOPE: CPT | Performed by: PHYSICIAN ASSISTANT

## 2024-10-07 PROCEDURE — 87186 SC STD MICRODIL/AGAR DIL: CPT | Performed by: PHYSICIAN ASSISTANT

## 2024-10-07 PROCEDURE — 87086 URINE CULTURE/COLONY COUNT: CPT | Performed by: PHYSICIAN ASSISTANT

## 2024-10-07 PROCEDURE — G2211 COMPLEX E/M VISIT ADD ON: HCPCS | Performed by: PHYSICIAN ASSISTANT

## 2024-10-07 RX ORDER — ESTRADIOL 0.1 MG/G
2 CREAM VAGINAL DAILY
Qty: 42.5 G | Refills: 0 | Status: SHIPPED | OUTPATIENT
Start: 2024-10-07

## 2024-10-07 RX ORDER — ENALAPRIL MALEATE 2.5 MG/1
2.5 TABLET ORAL DAILY
Qty: 90 TABLET | Refills: 1 | Status: SHIPPED | OUTPATIENT
Start: 2024-10-07

## 2024-10-07 RX ORDER — ATORVASTATIN CALCIUM 40 MG/1
40 TABLET, FILM COATED ORAL DAILY
COMMUNITY
Start: 2024-09-17 | End: 2024-10-07 | Stop reason: SDUPTHER

## 2024-10-07 RX ORDER — SOLIFENACIN SUCCINATE 10 MG/1
10 TABLET, FILM COATED ORAL DAILY
COMMUNITY
End: 2024-10-07 | Stop reason: SDUPTHER

## 2024-10-07 RX ORDER — OXYBUTYNIN CHLORIDE 5 MG/1
5 TABLET ORAL 2 TIMES DAILY
Qty: 180 TABLET | Refills: 1 | Status: SHIPPED | OUTPATIENT
Start: 2024-10-07

## 2024-10-07 RX ORDER — CYCLOBENZAPRINE HCL 5 MG
5 TABLET ORAL
Qty: 30 TABLET | Refills: 1 | Status: SHIPPED | OUTPATIENT
Start: 2024-10-07

## 2024-10-07 RX ORDER — FAMOTIDINE 40 MG/1
40 TABLET, FILM COATED ORAL DAILY
Qty: 90 TABLET | Refills: 1 | Status: SHIPPED | OUTPATIENT
Start: 2024-10-07

## 2024-10-07 RX ORDER — RAMELTEON 8 MG/1
8 TABLET ORAL
Qty: 90 TABLET | Refills: 1 | Status: SHIPPED | OUTPATIENT
Start: 2024-10-07

## 2024-10-07 RX ORDER — LEVOTHYROXINE SODIUM 75 UG/1
75 TABLET ORAL DAILY
Qty: 90 TABLET | Refills: 1 | Status: SHIPPED | OUTPATIENT
Start: 2024-10-07

## 2024-10-07 RX ORDER — ATORVASTATIN CALCIUM 40 MG/1
40 TABLET, FILM COATED ORAL DAILY
Qty: 90 TABLET | Refills: 1 | Status: SHIPPED | OUTPATIENT
Start: 2024-10-07

## 2024-10-07 RX ORDER — SOLIFENACIN SUCCINATE 10 MG/1
10 TABLET, FILM COATED ORAL DAILY
Qty: 90 TABLET | Refills: 1 | Status: SHIPPED | OUTPATIENT
Start: 2024-10-07

## 2024-10-07 NOTE — PROGRESS NOTES
Ambulatory Visit  Name: Sylvie Rodríguez      : 1955      MRN: [id number]  Encounter Provider: Reece Lam PA-C  Encounter Date: 10/7/2024   Encounter department: [facility]    Assessment & Plan  Acute bilateral low back pain without sciatica    Orders:    cyclobenzaprine (FLEXERIL) 5 mg tablet; Take 1 tablet (5 mg total) by mouth daily at bedtime    Type 2 diabetes mellitus without complication, with long-term current use of insulin ([facility])    Lab Results   Component Value Date    HGBA1C 6.7 (A) 2024       Orders:    levothyroxine 75 mcg tablet; Take 1 tablet (75 mcg total) by mouth daily    Sleep disturbance    Orders:    ramelteon (ROZEREM) 8 mg tablet; Take 1 tablet (8 mg total) by mouth daily at bedtime    Urinary tract infection without hematuria, site unspecified    Orders:    Urine culture; Future    POCT urine dip    Stress incontinence of urine    Orders:    oxybutynin (DITROPAN) 5 mg tablet; Take 1 tablet (5 mg total) by mouth 2 (two) times a day    solifenacin (VESICARE) 10 MG tablet; Take 1 tablet (10 mg total) by mouth daily    Gastroesophageal reflux disease without esophagitis    Orders:    famotidine (PEPCID) 40 MG tablet; Take 1 tablet (40 mg total) by mouth daily    Primary hypertension    Orders:    enalapril (VASOTEC) 2.5 mg tablet; Take 1 tablet (2.5 mg total) by mouth daily    Menopause    Orders:    estradiol (ESTRACE) 0.1 mg/g vaginal cream; Insert 2 g into the vagina daily    Dyslipidemia    Orders:    atorvastatin (LIPITOR) 40 mg tablet; Take 1 tablet (40 mg total) by mouth daily    Patient is a 69 year old female PMH Parkinson's disease, hypothyroidism, T2DM, overactive bladder presenting today w/ concern of dysuria for one week. She states that it burns w/ every urination and is concerned she has UTI. Patient was seen by URO/GYN on 2024 and was diagnosed and treated for overactive bladder. Of note, patient has been out of her oxybutynin and  vesicare x 1 week and has not been taking them.     Spot urine collected and positive for nitrites. Patient advised that urine would have to be sent for culture and once resulted, PCP will order appropriate antibiotic and office will call with instructions. Patient advised that she is chronic colonizer of bacteria and educated on importance of proper hygiene, frequent voiding,increased hydration, and avoiding irritants such as spicy food and artificial sweeteners. Patient also provided w/ refills for all medication as she will be leaving for [place] in one week.      History of Present Illness     Urinary Tract Infection   This is a recurrent problem. The current episode started in the past 7 days. The problem occurs every urination. The problem has been unchanged. The quality of the pain is described as burning. The pain is at a severity of 5/10. The pain is mild. There has been no fever. She is Not sexually active. Associated symptoms include urgency. Pertinent negatives include no hematuria. She has tried nothing for the symptoms. Her past medical history is significant for recurrent UTIs.         Review of Systems   Genitourinary:  Positive for urgency. Negative for hematuria.           Objective     /70   Pulse 88   Temp (!) 96.5 °F (35.8 °C)   Wt 68.1 kg (150 lb 3.2 oz)   SpO2 98%   BMI 28.85 kg/m²     Physical Exam  Constitutional:       Appearance: Normal appearance.   HENT:      Head: Normocephalic.      Right Ear: External ear normal.      Left Ear: External ear normal.      Nose: Nose normal.      Mouth/Throat:      Mouth: Mucous membranes are moist.      Pharynx: Oropharynx is clear.   Eyes:      Conjunctiva/sclera: Conjunctivae normal.   Cardiovascular:      Rate and Rhythm: Normal rate and regular rhythm.      Heart sounds: Normal heart sounds.   Pulmonary:      Effort: Pulmonary effort is normal.      Breath sounds: Normal breath sounds.   Abdominal:      General: Bowel sounds  are normal.      Palpations: Abdomen is soft.      Tenderness: There is no right CVA tenderness or left CVA tenderness.   Neurological:      Mental Status: She is alert and oriented to person, place, and time.   Psychiatric:         Behavior: Behavior normal.

## 2024-10-07 NOTE — ASSESSMENT & PLAN NOTE
Lab Results   Component Value Date    HGBA1C 6.7 (A) 07/19/2024       Orders:    levothyroxine 75 mcg tablet; Take 1 tablet (75 mcg total) by mouth daily

## 2024-10-09 ENCOUNTER — TELEPHONE (OUTPATIENT)
Dept: FAMILY MEDICINE CLINIC | Facility: CLINIC | Age: 69
End: 2024-10-09

## 2024-10-09 DIAGNOSIS — N39.0 URINARY TRACT INFECTION WITHOUT HEMATURIA, SITE UNSPECIFIED: Primary | ICD-10-CM

## 2024-10-09 LAB — BACTERIA UR CULT: ABNORMAL

## 2024-10-09 RX ORDER — SULFAMETHOXAZOLE/TRIMETHOPRIM 800-160 MG
1 TABLET ORAL 2 TIMES DAILY
Qty: 6 TABLET | Refills: 0 | Status: SHIPPED | OUTPATIENT
Start: 2024-10-09 | End: 2024-10-12

## 2024-10-09 NOTE — TELEPHONE ENCOUNTER
----- Message from Reece Lam PA-C sent at 10/9/2024 11:39 AM EDT -----  Hi Ladies,     Please call to inform patient that I am sending over an antibiotic to treat for infection. She will take this twice per day for 3 days.     Any questions, please let me know.     Thank you,   Reece Lam PA-C

## 2024-10-09 NOTE — TELEPHONE ENCOUNTER
#[phone] assisted in the call.  A voicemail was left telling Patient to  antibiotics from Pharmacy. If Patient has any questions please call the office.

## 2024-10-28 DIAGNOSIS — Z79.4 TYPE 2 DIABETES MELLITUS WITHOUT COMPLICATION, WITH LONG-TERM CURRENT USE OF INSULIN (HCC): ICD-10-CM

## 2024-10-28 DIAGNOSIS — E11.9 TYPE 2 DIABETES MELLITUS WITHOUT COMPLICATION, WITH LONG-TERM CURRENT USE OF INSULIN (HCC): ICD-10-CM

## 2024-10-29 RX ORDER — TIRZEPATIDE 2.5 MG/.5ML
INJECTION, SOLUTION SUBCUTANEOUS
Qty: 2 ML | Refills: 0 | Status: SHIPPED | OUTPATIENT
Start: 2024-10-29

## 2024-11-08 DIAGNOSIS — E11.9 TYPE 2 DIABETES MELLITUS WITHOUT COMPLICATION, WITH LONG-TERM CURRENT USE OF INSULIN (HCC): ICD-10-CM

## 2024-11-08 DIAGNOSIS — Z79.4 TYPE 2 DIABETES MELLITUS WITHOUT COMPLICATION, WITH LONG-TERM CURRENT USE OF INSULIN (HCC): ICD-10-CM

## 2024-11-08 RX ORDER — CALCIUM CITRATE/VITAMIN D3 200MG-6.25
TABLET ORAL
Qty: 100 EACH | Refills: 1 | Status: SHIPPED | OUTPATIENT
Start: 2024-11-08

## 2024-11-18 ENCOUNTER — OFFICE VISIT (OUTPATIENT)
Dept: FAMILY MEDICINE CLINIC | Facility: CLINIC | Age: 69
End: 2024-11-18
Payer: COMMERCIAL

## 2024-11-18 ENCOUNTER — TELEPHONE (OUTPATIENT)
Dept: FAMILY MEDICINE CLINIC | Facility: CLINIC | Age: 69
End: 2024-11-18

## 2024-11-18 VITALS
DIASTOLIC BLOOD PRESSURE: 80 MMHG | HEART RATE: 106 BPM | WEIGHT: 147.2 LBS | OXYGEN SATURATION: 97 % | TEMPERATURE: 96.6 F | BODY MASS INDEX: 28.27 KG/M2 | SYSTOLIC BLOOD PRESSURE: 114 MMHG

## 2024-11-18 DIAGNOSIS — R30.0 DYSURIA: Primary | ICD-10-CM

## 2024-11-18 PROCEDURE — G2211 COMPLEX E/M VISIT ADD ON: HCPCS | Performed by: PHYSICIAN ASSISTANT

## 2024-11-18 PROCEDURE — 99214 OFFICE O/P EST MOD 30 MIN: CPT | Performed by: PHYSICIAN ASSISTANT

## 2024-11-18 PROCEDURE — 87186 SC STD MICRODIL/AGAR DIL: CPT | Performed by: PHYSICIAN ASSISTANT

## 2024-11-18 PROCEDURE — 87086 URINE CULTURE/COLONY COUNT: CPT | Performed by: PHYSICIAN ASSISTANT

## 2024-11-18 PROCEDURE — 87077 CULTURE AEROBIC IDENTIFY: CPT | Performed by: PHYSICIAN ASSISTANT

## 2024-11-18 NOTE — PROGRESS NOTES
Name: Sylvie Rodríguez      : 1955      MRN: [id number]  Encounter Provider: Reece Lam PA-C  Encounter Date: 2024   Encounter department: [facility]IMARY [facility]ARE    Assessment & Plan  Dysuria     -Pt presents with 7 day history of dysuria   - Medical history positive for cystocele with prolapse, overactive bladder   - Physical exam significant for bilateral CVA tenderness   - Again explained to patient she is both chronic colonizer and her PMH of OAB, cystocele w/ prolapse leaves her prone to infections and experiencing urinary symptoms and the need to f/u w/ uro-gyn. Pt expressed understanding    - PCP collected urine for culture and will treat as appropriate.   Orders:    Urine culture; Future         History of Present Illness     Urinary Tract Infection   This is a recurrent problem. The current episode started in the past 7 days. The problem occurs every urination. The problem has been gradually worsening. The quality of the pain is described as burning. The pain is at a severity of 7/10. The pain is moderate. There has been no fever. The fever has been present for Less than 1 day. She is Not sexually active. There is No history of pyelonephritis. Associated symptoms include frequency and urgency. Pertinent negatives include no discharge, hematuria, nausea or vomiting. She has tried nothing for the symptoms.     Review of Systems   Constitutional:  Negative for fatigue and fever.   Respiratory:  Negative for cough and shortness of breath.    Cardiovascular:  Negative for chest pain.   Gastrointestinal:  Negative for diarrhea, nausea and vomiting.   Genitourinary:  Positive for frequency and urgency. Negative for hematuria.   Neurological:  Negative for headaches.     Past Medical History:   Diagnosis Date    Anxiety     Bowel incontinence     Diabetes mellitus (HCC)     Hypothyroidism     Parkinson's disease (HCC)     Vertigo      Past Surgical History:   Procedure Laterality  Date    MYOMECTOMY      SHOULDER SURGERY Left     UTERINE FIBROID SURGERY       History reviewed. No pertinent family history.  Social History     Tobacco Use    Smoking status: Never     Passive exposure: Never    Smokeless tobacco: Never   Vaping Use    Vaping status: Never Used   Substance and Sexual Activity    Alcohol use: Never    Drug use: Never    Sexual activity: Not Currently     Current Outpatient Medications on File Prior to Visit   Medication Sig    Alcohol Swabs (Pharmacist Choice Alcohol) PADS Use as directed    ammonium lactate (LAC-HYDRIN) 12 % cream APPLY HASTA AFFECTED AREA DAILY IGLESIA LAS INSTRUCCIONES APPLY TO AFFECTED AREA DAILY AS DIRECTED.    atorvastatin (LIPITOR) 40 mg tablet Take 1 tablet (40 mg total) by mouth daily    carbidopa-levodopa (SINEMET)  mg per tablet Take 1 tablet by mouth 2 (two) times a day    cyclobenzaprine (FLEXERIL) 5 mg tablet Take 1 tablet (5 mg total) by mouth daily at bedtime    enalapril (VASOTEC) 2.5 mg tablet Take 1 tablet (2.5 mg total) by mouth daily    estradiol (ESTRACE) 0.1 mg/g vaginal cream Insert 2 g into the vagina daily    famotidine (PEPCID) 40 MG tablet Take 1 tablet (40 mg total) by mouth daily    glucose blood (True Metrix Blood Glucose Test) test strip USE IGLESIA INDICADO DOS VECES AL DIAUSE AS DIRECTED TWICE DAILY    levothyroxine 75 mcg tablet Take 1 tablet (75 mcg total) by mouth daily    Mounjaro 2.5 MG/0.5ML SOAJ INJECT 0.5 ML (2.5 MG TOTAL) UNDER THE SKIN ONCE A WEEK    oxybutynin (DITROPAN) 5 mg tablet Take 1 tablet (5 mg total) by mouth 2 (two) times a day    ramelteon (ROZEREM) 8 mg tablet Take 1 tablet (8 mg total) by mouth daily at bedtime    solifenacin (VESICARE) 10 MG tablet Take 1 tablet (10 mg total) by mouth daily    Synjardy XR 5-1000 MG TB24 TAKE 2 TABLET,IMMED AND EXTEND REL BIPHASE 24HR ORALLY EVERY MORNING    TRUEplus Lancets 30G MISC USE IGLESIA INDICADOUSE AS DIRECTED     Allergies   Allergen Reactions    Coffea Arabica  Sneezing     Immunization History   Administered Date(s) Administered    COVID-19 MODERNA VACC 0.5 ML IM 02/10/2021, 03/10/2021, 10/21/2021, 04/01/2022    Pneumococcal Conjugate Vaccine 20-valent (Pcv20), Polysace 07/19/2024     Objective   /80   Pulse (!) 106   Temp (!) 96.6 °F (35.9 °C)   Wt 66.8 kg (147 lb 3.2 oz)   SpO2 97%   BMI 28.27 kg/m²     Physical Exam  Constitutional:       Appearance: Normal appearance.   HENT:      Head: Normocephalic.      Right Ear: External ear normal.      Left Ear: External ear normal.      Nose: Nose normal.      Mouth/Throat:      Mouth: Mucous membranes are moist.      Pharynx: Oropharynx is clear.   Eyes:      Conjunctiva/sclera: Conjunctivae normal.   Cardiovascular:      Rate and Rhythm: Normal rate and regular rhythm.      Heart sounds: Normal heart sounds.   Pulmonary:      Effort: Pulmonary effort is normal.      Breath sounds: Normal breath sounds.   Abdominal:      General: Bowel sounds are normal.      Palpations: Abdomen is soft.      Tenderness: There is right CVA tenderness and left CVA tenderness.   Neurological:      Mental Status: She is alert and oriented to person, place, and time.   Psychiatric:         Behavior: Behavior normal.

## 2024-11-18 NOTE — TELEPHONE ENCOUNTER
----- Message from Reece Lam PA-C sent at 11/16/2024  6:24 PM EST -----  Regarding: Normal Mammo  Hi Ladies,    Please inform patient that her mammogram did not show any masses changes concerning for breast cancer at this time. She may continue routine screening in 1 year.    Best,  Reece Lam PA-C

## 2024-11-19 DIAGNOSIS — Z12.31 ENCOUNTER FOR SCREENING MAMMOGRAM FOR MALIGNANT NEOPLASM OF BREAST: ICD-10-CM

## 2024-11-20 ENCOUNTER — RESULTS FOLLOW-UP (OUTPATIENT)
Dept: FAMILY MEDICINE CLINIC | Facility: CLINIC | Age: 69
End: 2024-11-20

## 2024-11-20 ENCOUNTER — TELEPHONE (OUTPATIENT)
Age: 69
End: 2024-11-20

## 2024-11-20 DIAGNOSIS — N30.00 ACUTE CYSTITIS WITHOUT HEMATURIA: Primary | ICD-10-CM

## 2024-11-20 LAB — BACTERIA UR CULT: ABNORMAL

## 2024-11-20 RX ORDER — NITROFURANTOIN 25; 75 MG/1; MG/1
100 CAPSULE ORAL 2 TIMES DAILY
Qty: 10 CAPSULE | Refills: 0 | Status: SHIPPED | OUTPATIENT
Start: 2024-11-20 | End: 2024-11-25

## 2024-11-20 NOTE — TELEPHONE ENCOUNTER
Pt called requesting an  for the call. CTS connected patient with an . Pt asking for the results of urine sample. Pt made aware the results are complete, however the provider has not had the opportunity to review them yet and someone will call her with recommendations once reviewed.  Pt also requesting an  when call back is made and a prescription be sent to [facility].    Please advise

## 2024-11-22 NOTE — TELEPHONE ENCOUNTER
[id number]:  Called patient to inform her of the message below. Patient is aware and will schedule follow up with uro-gyn.

## 2024-11-22 NOTE — TELEPHONE ENCOUNTER
----- Message from Reece Lam PA-C sent at 11/20/2024  3:41 PM EST -----  Hi Ladies,     Please inform patient that her culture has resulted and she has UTI. I ordered an ABX for her to take 2x daily for the next 5 days. Please also remind patient to complete follow up with uro-gyn.     Thanks,   Reece Lam PA-C

## 2024-11-27 ENCOUNTER — TELEPHONE (OUTPATIENT)
Dept: FAMILY MEDICINE CLINIC | Facility: CLINIC | Age: 69
End: 2024-11-27

## 2024-11-27 DIAGNOSIS — M81.0 AGE-RELATED OSTEOPOROSIS WITHOUT CURRENT PATHOLOGICAL FRACTURE: ICD-10-CM

## 2024-11-27 DIAGNOSIS — Z13.820 ENCOUNTER FOR SCREENING FOR OSTEOPOROSIS: ICD-10-CM

## 2024-11-27 NOTE — TELEPHONE ENCOUNTER
[id number]:  Called patient to relay test results below. Patient did not answer, left a voicemail to call the office at [phone].

## 2024-11-27 NOTE — TELEPHONE ENCOUNTER
----- Message from Cindy Saleh MD sent at 11/24/2024  1:40 PM EST -----  Regarding: Dexa Scan Results  Hi Ladies,    Can we please call patient and inform her of the below:    Please note that your bone density scan showed normal bone mineral density levels.Please feel free to reach out if any questions or concerns arise.    Best,

## 2024-12-02 NOTE — TELEPHONE ENCOUNTER
[id number]:  Called patient to relay test results below. Patient is aware and no questions or concerns.

## 2024-12-03 DIAGNOSIS — Z79.4 TYPE 2 DIABETES MELLITUS WITHOUT COMPLICATION, WITH LONG-TERM CURRENT USE OF INSULIN (HCC): ICD-10-CM

## 2024-12-03 DIAGNOSIS — E11.9 TYPE 2 DIABETES MELLITUS WITHOUT COMPLICATION, WITH LONG-TERM CURRENT USE OF INSULIN (HCC): ICD-10-CM

## 2024-12-10 RX ORDER — TIRZEPATIDE 2.5 MG/.5ML
INJECTION, SOLUTION SUBCUTANEOUS
Qty: 2 ML | Refills: 1 | Status: SHIPPED | OUTPATIENT
Start: 2024-12-10

## 2024-12-13 ENCOUNTER — APPOINTMENT (OUTPATIENT)
Dept: LAB | Facility: CLINIC | Age: 69
End: 2024-12-13
Payer: COMMERCIAL

## 2024-12-13 ENCOUNTER — OFFICE VISIT (OUTPATIENT)
Dept: FAMILY MEDICINE CLINIC | Facility: CLINIC | Age: 69
End: 2024-12-13

## 2024-12-13 VITALS
DIASTOLIC BLOOD PRESSURE: 62 MMHG | TEMPERATURE: 96.7 F | SYSTOLIC BLOOD PRESSURE: 110 MMHG | BODY MASS INDEX: 27.3 KG/M2 | HEART RATE: 91 BPM | HEIGHT: 61 IN | OXYGEN SATURATION: 97 % | WEIGHT: 144.6 LBS

## 2024-12-13 DIAGNOSIS — G20.A2 PARKINSON'S DISEASE WITHOUT DYSKINESIA, WITH FLUCTUATING MANIFESTATIONS (HCC): ICD-10-CM

## 2024-12-13 DIAGNOSIS — K90.89 POOR IRON ABSORPTION: ICD-10-CM

## 2024-12-13 DIAGNOSIS — E55.9 VITAMIN D DEFICIENCY: ICD-10-CM

## 2024-12-13 DIAGNOSIS — E03.9 ACQUIRED HYPOTHYROIDISM: ICD-10-CM

## 2024-12-13 DIAGNOSIS — E11.9 TYPE 2 DIABETES MELLITUS WITHOUT COMPLICATION, WITH LONG-TERM CURRENT USE OF INSULIN (HCC): ICD-10-CM

## 2024-12-13 DIAGNOSIS — Z79.4 TYPE 2 DIABETES MELLITUS WITHOUT COMPLICATION, WITH LONG-TERM CURRENT USE OF INSULIN (HCC): ICD-10-CM

## 2024-12-13 DIAGNOSIS — R53.83 OTHER FATIGUE: ICD-10-CM

## 2024-12-13 DIAGNOSIS — N30.00 ACUTE CYSTITIS WITHOUT HEMATURIA: Primary | ICD-10-CM

## 2024-12-13 DIAGNOSIS — N81.4 CYSTOCELE WITH PROLAPSE: ICD-10-CM

## 2024-12-13 DIAGNOSIS — K21.9 GASTROESOPHAGEAL REFLUX DISEASE WITHOUT ESOPHAGITIS: ICD-10-CM

## 2024-12-13 PROBLEM — N39.3 SUI (STRESS URINARY INCONTINENCE, FEMALE): Status: ACTIVE | Noted: 2024-12-13

## 2024-12-13 PROBLEM — N32.81 OAB (OVERACTIVE BLADDER): Status: ACTIVE | Noted: 2024-12-13

## 2024-12-13 LAB
25(OH)D3 SERPL-MCNC: 42.5 NG/ML (ref 30–100)
BASOPHILS # BLD AUTO: 0.06 THOUSANDS/ÂΜL (ref 0–0.1)
BASOPHILS NFR BLD AUTO: 1 % (ref 0–1)
CHOLEST SERPL-MCNC: 84 MG/DL (ref ?–200)
EOSINOPHIL # BLD AUTO: 0.13 THOUSAND/ÂΜL (ref 0–0.61)
EOSINOPHIL NFR BLD AUTO: 1 % (ref 0–6)
ERYTHROCYTE [DISTWIDTH] IN BLOOD BY AUTOMATED COUNT: 15.4 % (ref 11.6–15.1)
EST. AVERAGE GLUCOSE BLD GHB EST-MCNC: 146 MG/DL
FERRITIN SERPL-MCNC: 9 NG/ML (ref 11–307)
HBA1C MFR BLD: 6.7 %
HCT VFR BLD AUTO: 42.6 % (ref 34.8–46.1)
HDLC SERPL-MCNC: 41 MG/DL
HGB BLD-MCNC: 13.4 G/DL (ref 11.5–15.4)
IMM GRANULOCYTES # BLD AUTO: 0.03 THOUSAND/UL (ref 0–0.2)
IMM GRANULOCYTES NFR BLD AUTO: 0 % (ref 0–2)
IRON SATN MFR SERPL: 22 % (ref 15–50)
IRON SERPL-MCNC: 83 UG/DL (ref 50–212)
LDLC SERPL CALC-MCNC: 21 MG/DL (ref 0–100)
LYMPHOCYTES # BLD AUTO: 3.4 THOUSANDS/ÂΜL (ref 0.6–4.47)
LYMPHOCYTES NFR BLD AUTO: 32 % (ref 14–44)
MCH RBC QN AUTO: 29.1 PG (ref 26.8–34.3)
MCHC RBC AUTO-ENTMCNC: 31.5 G/DL (ref 31.4–37.4)
MCV RBC AUTO: 93 FL (ref 82–98)
MONOCYTES # BLD AUTO: 1 THOUSAND/ÂΜL (ref 0.17–1.22)
MONOCYTES NFR BLD AUTO: 10 % (ref 4–12)
NEUTROPHILS # BLD AUTO: 5.96 THOUSANDS/ÂΜL (ref 1.85–7.62)
NEUTS SEG NFR BLD AUTO: 56 % (ref 43–75)
NRBC BLD AUTO-RTO: 0 /100 WBCS
PLATELET # BLD AUTO: 287 THOUSANDS/UL (ref 149–390)
PMV BLD AUTO: 10.4 FL (ref 8.9–12.7)
RBC # BLD AUTO: 4.6 MILLION/UL (ref 3.81–5.12)
SL AMB  POCT GLUCOSE, UA: ABNORMAL
SL AMB LEUKOCYTE ESTERASE,UA: ABNORMAL
SL AMB POCT BILIRUBIN,UA: NEGATIVE
SL AMB POCT BLOOD,UA: ABNORMAL
SL AMB POCT CLARITY,UA: ABNORMAL
SL AMB POCT COLOR,UA: YELLOW
SL AMB POCT KETONES,UA: NEGATIVE
SL AMB POCT NITRITE,UA: POSITIVE
SL AMB POCT PH,UA: 6.5
SL AMB POCT SPECIFIC GRAVITY,UA: 1.01
SL AMB POCT URINE PROTEIN: NEGATIVE
SL AMB POCT UROBILINOGEN: ABNORMAL
T4 FREE SERPL-MCNC: 1.91 NG/DL (ref 0.61–1.12)
TIBC SERPL-MCNC: 369 UG/DL (ref 250–450)
TRIGL SERPL-MCNC: 110 MG/DL (ref ?–150)
TSH SERPL DL<=0.05 MIU/L-ACNC: 0.03 UIU/ML (ref 0.45–4.5)
UIBC SERPL-MCNC: 286 UG/DL (ref 155–355)
WBC # BLD AUTO: 10.58 THOUSAND/UL (ref 4.31–10.16)

## 2024-12-13 PROCEDURE — 84443 ASSAY THYROID STIM HORMONE: CPT

## 2024-12-13 PROCEDURE — 83550 IRON BINDING TEST: CPT

## 2024-12-13 PROCEDURE — 82306 VITAMIN D 25 HYDROXY: CPT

## 2024-12-13 PROCEDURE — 80061 LIPID PANEL: CPT

## 2024-12-13 PROCEDURE — 87077 CULTURE AEROBIC IDENTIFY: CPT | Performed by: FAMILY MEDICINE

## 2024-12-13 PROCEDURE — 85025 COMPLETE CBC W/AUTO DIFF WBC: CPT

## 2024-12-13 PROCEDURE — 82728 ASSAY OF FERRITIN: CPT

## 2024-12-13 PROCEDURE — 83036 HEMOGLOBIN GLYCOSYLATED A1C: CPT

## 2024-12-13 PROCEDURE — 36415 COLL VENOUS BLD VENIPUNCTURE: CPT

## 2024-12-13 PROCEDURE — 84439 ASSAY OF FREE THYROXINE: CPT

## 2024-12-13 PROCEDURE — 83540 ASSAY OF IRON: CPT

## 2024-12-13 PROCEDURE — 87086 URINE CULTURE/COLONY COUNT: CPT | Performed by: FAMILY MEDICINE

## 2024-12-13 PROCEDURE — 87186 SC STD MICRODIL/AGAR DIL: CPT | Performed by: FAMILY MEDICINE

## 2024-12-13 RX ORDER — FAMOTIDINE 40 MG/1
40 TABLET, FILM COATED ORAL DAILY
Qty: 90 TABLET | Refills: 1 | Status: SHIPPED | OUTPATIENT
Start: 2024-12-13

## 2024-12-13 RX ORDER — PHENAZOPYRIDINE HYDROCHLORIDE 100 MG/1
100 TABLET, FILM COATED ORAL 3 TIMES DAILY PRN
Qty: 21 TABLET | Refills: 0 | Status: SHIPPED | OUTPATIENT
Start: 2024-12-13 | End: 2024-12-20

## 2024-12-13 RX ORDER — NITROFURANTOIN 25; 75 MG/1; MG/1
100 CAPSULE ORAL 2 TIMES DAILY
Qty: 28 CAPSULE | Refills: 0 | Status: SHIPPED | OUTPATIENT
Start: 2024-12-13 | End: 2024-12-27

## 2024-12-13 NOTE — ASSESSMENT & PLAN NOTE
Lab Results   Component Value Date    HGBA1C 6.7 (A) 07/19/2024       Orders:    Hemoglobin A1C; Future    Lipid Panel with Direct LDL reflex; Future

## 2024-12-13 NOTE — ASSESSMENT & PLAN NOTE
-PCP again explained to patient diagnosis of grade 2 cystocele for which she is being evaluated with urogynecology  -PCP reviewed recommendations with patient from urogynecology including pessary, surgical intervention and pharmacotherapy i.e. Vesicare and oxybutynin given overlapping overactive bladder disease and stress urinary incontinence  -PCP also reminded patient that she does have evaluation with urology on 1/3/2025 which she must attend for further discussions of possibility of further interventions

## 2024-12-13 NOTE — PROGRESS NOTES
Name: Sylvie Rodríguez      : 1955      MRN: [id number]  Encounter Provider: Cindy Saleh MD  Encounter Date: 2024   Encounter department: [facility] PR[facility]  :  Assessment & Plan  Acute cystitis without hematuria  -Patient reports with concern of dysuria x 1 week  -Patient with recurrent UTIs in setting of grade 2 cystocele with urine cultures x 2 positive for Klebsiella  -PCP discussed with patient at length that point-of-care urine dip consistent with new infection given nitrates positive.  PCP has sent for extended trial of Macrobid and will send out urine culture again for sensitivities.  Orders:    POCT urine dip    nitrofurantoin (MACROBID) 100 mg capsule; Take 1 capsule (100 mg total) by mouth 2 (two) times a day for 14 days    phenazopyridine (PYRIDIUM) 100 mg tablet; Take 1 tablet (100 mg total) by mouth 3 (three) times a day as needed for bladder spasms for up to 7 days    Urine culture; Future    Cystocele with prolapse  -PCP again explained to patient diagnosis of grade 2 cystocele for which she is being evaluated with urogynecology  -PCP reviewed recommendations with patient from urogynecology including pessary, surgical intervention and pharmacotherapy i.e. Vesicare and oxybutynin given overlapping overactive bladder disease and stress urinary incontinence  -PCP also reminded patient that she does have evaluation with urology on 1/3/2025 which she must attend for further discussions of possibility of further interventions         Parkinson's disease without dyskinesia, with fluctuating manifestations (HCC)  -Patient requesting refill of Sinemet at this time, however, upon further review of previous neurology visit on 10/8/2024.  Neurologist concern for drug-induced Parkinson's versus organic Parkinson's in setting of Geodon use  -Neurologist made recommendation to discontinue Sinemet as Geodon was already discontinued as well to assess for parkinsonian symptoms  "over the next several months       Gastroesophageal reflux disease without esophagitis    Orders:    famotidine (PEPCID) 40 MG tablet; Take 1 tablet (40 mg total) by mouth daily    Other fatigue  -Patient reporting over the last several weeks she has been feeling fatigued and is concerned that her hemoglobin might be low  -PCP noted that hemoglobin from April/2024 was stable at 14.1.  Since patient is due for blood work in the neck several months PCP has placed orders at this time for further evaluation  Orders:    CBC and differential; Future    Vitamin D 25 hydroxy; Future    Iron Panel (Includes Ferritin, Iron Sat%, Iron, and TIBC); Future    Type 2 diabetes mellitus without complication, with long-term current use of insulin ([facility])    Lab Results   Component Value Date    HGBA1C 6.7 (A) 07/19/2024       Orders:    Hemoglobin A1C; Future    Lipid Panel with Direct LDL reflex; Future    Acquired hypothyroidism  -Patient to continue levothyroxine 75 mcg at this time  Orders:    T4, free; Future    TSH, 3rd generation; Future    Vitamin D deficiency    Orders:    Vitamin D 25 hydroxy; Future    Poor iron absorption    Orders:    Iron Panel (Includes Ferritin, Iron Sat%, Iron, and TIBC); Future           History of Present Illness     Urinary Tract Infection   This is a new problem. The current episode started in the past 7 days. The problem occurs every urination. The problem has been gradually worsening. The quality of the pain is described as burning. There has been no fever. Pertinent negatives include no flank pain or hematuria.     Review of Systems   Genitourinary:  Negative for flank pain and hematuria.       Objective   /62 (BP Location: Left arm, Patient Position: Sitting, Cuff Size: Adult)   Pulse 91   Temp (!) 96.7 °F (35.9 °C) (Tympanic)   Ht 5' 0.5\" (1.537 m)   Wt 65.6 kg (144 lb 9.6 oz)   SpO2 97%   BMI 27.78 kg/m²      Physical Exam  Constitutional:       Appearance: She is well-developed. "   HENT:      Head: Normocephalic and atraumatic.   Eyes:      Conjunctiva/sclera: Conjunctivae normal.   Cardiovascular:      Rate and Rhythm: Normal rate and regular rhythm.      Heart sounds: Normal heart sounds.   Pulmonary:      Effort: Pulmonary effort is normal.      Breath sounds: Normal breath sounds.   Musculoskeletal:      Cervical back: Normal range of motion and neck supple.   Skin:     General: Skin is warm and dry.   Neurological:      Mental Status: She is alert and oriented to person, place, and time.       Administrative Statements   I have spent a total time of 40 minutes in caring for this patient on the day of the visit/encounter including Diagnostic results, Risks and benefits of tx options, Impressions, Counseling / Coordination of care, Documenting in the medical record, Reviewing / ordering tests, medicine, procedures  , and Obtaining or reviewing history  .

## 2024-12-13 NOTE — ASSESSMENT & PLAN NOTE
-Patient to continue levothyroxine 75 mcg at this time  Orders:    T4, free; Future    TSH, 3rd generation; Future

## 2024-12-13 NOTE — ASSESSMENT & PLAN NOTE
-Patient requesting refill of Sinemet at this time, however, upon further review of previous neurology visit on 10/8/2024.  Neurologist concern for drug-induced Parkinson's versus organic Parkinson's in setting of Geodon use  -Neurologist made recommendation to discontinue Sinemet as Geodon was already discontinued as well to assess for parkinsonian symptoms over the next several months

## 2024-12-15 ENCOUNTER — RESULTS FOLLOW-UP (OUTPATIENT)
Dept: FAMILY MEDICINE CLINIC | Facility: CLINIC | Age: 69
End: 2024-12-15

## 2024-12-15 DIAGNOSIS — E03.9 ACQUIRED HYPOTHYROIDISM: ICD-10-CM

## 2024-12-15 DIAGNOSIS — R79.0 DECREASED FERRITIN: Primary | ICD-10-CM

## 2024-12-15 LAB — BACTERIA UR CULT: ABNORMAL

## 2024-12-15 RX ORDER — LEVOTHYROXINE SODIUM 50 UG/1
50 TABLET ORAL
Qty: 100 TABLET | Refills: 1 | Status: SHIPPED | OUTPATIENT
Start: 2024-12-15

## 2024-12-15 RX ORDER — FERROUS SULFATE 324(65)MG
324 TABLET, DELAYED RELEASE (ENTERIC COATED) ORAL
Qty: 90 TABLET | Refills: 1 | Status: SHIPPED | OUTPATIENT
Start: 2024-12-15

## 2024-12-15 NOTE — RESULT ENCOUNTER NOTE
Hi Ladies,    Can we please call the patient and inform her of the following:    Her cholesterol levels were excellent as compared to last time, her Triglycerides went down from 429 to 110. She will need to continue her cholesterol medication. Her 3 month average of blood sugars has remained the same at 6.7 which indicates good control of her Type 2 Diabetes. Her Vitamin D level was normal. Her iron storage level called the ferritin level was low and the amount of actual iron in the blood was normal but on the lowe end. Hence, I have sent her iron pills to take once daily for this. Lastly,er thyroid levels indicate that we need to decrease her levothyroxine dose, so I am recommending a decrease from 75mcg to 50mcg and I have sent the new order to the pharmacy. I have also placed orders in the system so that we can recheck her levels in 2 months to ensure they have improved. Please let me know if she has any further questions or concerns.    Best,

## 2024-12-19 NOTE — TELEPHONE ENCOUNTER
Called to notify patient, no answer. Left voicemail.      ----- Message from Cindy Saleh MD sent at 12/15/2024  2:40 PM EST -----  Hi Ladies,    Can we please call the patient and inform her of the following:    Her cholesterol levels were excellent as compared to last time, her Triglycerides went down from 429 to 110. She will need to continue her cholesterol medication. Her 3 month average of blood sugars has remained the same at 6.7 which indicates good control of her Type 2 Diabetes. Her Vitamin D level was normal. Her iron storage level called the ferritin level was low and the amount of actual iron in the blood was normal but on the lowe end. Hence, I have sent her iron pills to take once daily for this. Lastly,er thyroid levels indicate that we need to decrease her levothyroxine dose, so I am recommending a decrease from 75mcg to 50mcg and I have sent the new order to the pharmacy. I have also placed orders in the system so that we can recheck her levels in 2 months to ensure they have improved. Please let me know if she has any further questions or concerns.    Best,

## 2025-01-24 ENCOUNTER — TELEPHONE (OUTPATIENT)
Dept: FAMILY MEDICINE CLINIC | Facility: CLINIC | Age: 70
End: 2025-01-24

## 2025-01-24 DIAGNOSIS — N39.3 SUI (STRESS URINARY INCONTINENCE, FEMALE): ICD-10-CM

## 2025-01-24 DIAGNOSIS — N81.4 CYSTOCELE WITH PROLAPSE: Primary | ICD-10-CM

## 2025-01-24 NOTE — TELEPHONE ENCOUNTER
Patient requesting a gynecology referral to be faxed to [facility]  Gynecology fax: [phone]

## 2025-01-30 ENCOUNTER — TELEPHONE (OUTPATIENT)
Age: 70
End: 2025-01-30

## 2025-01-30 DIAGNOSIS — G47.9 SLEEP DISTURBANCE: ICD-10-CM

## 2025-01-30 NOTE — TELEPHONE ENCOUNTER
Via  ID # [id number] Roland   Patient has referral for GYN follow up cystocele with prolapse, difficulty holding urine. Patient would like to schedule appointment now, requested late in day so that family/ care giver can provide assistance/ transportation.  Patient can travel to [place], appointment scheduled and provided patient # for care giver to call back to confirm address etc.

## 2025-01-30 NOTE — TELEPHONE ENCOUNTER
Language line used: Shamar [id number]  Pharmacy told patient that she does not have any refills left    Reason for call:   [x] Refill   [] Prior Auth  [] Other:     Office:   [x] PCP/Provider - [facility]  Authorized By: Reece Lam PA-C    [] Specialty/Provider -     Medication: ramelteon (ROZEREM) 8 mg tablet    Dose/Frequency: Take 1 tablet (8 mg total) by mouth daily at bedtime    Quantity: 90 tablet    [facility] [address]    Does the patient have enough for 3 days?   [] Yes   [x] No - Send as HP to POD

## 2025-02-03 RX ORDER — RAMELTEON 8 MG/1
8 TABLET ORAL
Qty: 90 TABLET | Refills: 0 | Status: SHIPPED | OUTPATIENT
Start: 2025-02-03 | End: 2025-02-09 | Stop reason: SDUPTHER

## 2025-02-09 RX ORDER — RAMELTEON 8 MG/1
8 TABLET ORAL
Qty: 90 TABLET | Refills: 1 | Status: SHIPPED | OUTPATIENT
Start: 2025-02-09 | End: 2025-02-11 | Stop reason: CLARIF

## 2025-02-11 DIAGNOSIS — G47.9 SLEEP DISTURBANCE: Primary | ICD-10-CM

## 2025-03-12 DIAGNOSIS — E03.9 ACQUIRED HYPOTHYROIDISM: ICD-10-CM

## 2025-03-12 RX ORDER — LEVOTHYROXINE SODIUM 50 UG/1
50 TABLET ORAL
Qty: 100 TABLET | Refills: 1 | Status: SHIPPED | OUTPATIENT
Start: 2025-03-12

## 2025-03-17 ENCOUNTER — OFFICE VISIT (OUTPATIENT)
Dept: FAMILY MEDICINE CLINIC | Facility: CLINIC | Age: 70
End: 2025-03-17
Payer: COMMERCIAL

## 2025-03-17 VITALS
TEMPERATURE: 96.3 F | WEIGHT: 142 LBS | HEART RATE: 97 BPM | SYSTOLIC BLOOD PRESSURE: 110 MMHG | DIASTOLIC BLOOD PRESSURE: 72 MMHG | OXYGEN SATURATION: 99 % | BODY MASS INDEX: 27.28 KG/M2

## 2025-03-17 DIAGNOSIS — G20.A2 PARKINSON'S DISEASE WITHOUT DYSKINESIA, WITH FLUCTUATING MANIFESTATIONS (HCC): ICD-10-CM

## 2025-03-17 DIAGNOSIS — N39.46 MIXED STRESS AND URGE URINARY INCONTINENCE: ICD-10-CM

## 2025-03-17 DIAGNOSIS — Z79.4 TYPE 2 DIABETES MELLITUS WITHOUT COMPLICATION, WITH LONG-TERM CURRENT USE OF INSULIN (HCC): ICD-10-CM

## 2025-03-17 DIAGNOSIS — G47.9 SLEEP DISTURBANCE: ICD-10-CM

## 2025-03-17 DIAGNOSIS — E11.9 TYPE 2 DIABETES MELLITUS WITHOUT COMPLICATION, WITH LONG-TERM CURRENT USE OF INSULIN (HCC): ICD-10-CM

## 2025-03-17 DIAGNOSIS — N81.4 CYSTOCELE WITH PROLAPSE: Primary | ICD-10-CM

## 2025-03-17 LAB
SL AMB  POCT GLUCOSE, UA: 500
SL AMB LEUKOCYTE ESTERASE,UA: NEGATIVE
SL AMB POCT BILIRUBIN,UA: NEGATIVE
SL AMB POCT BLOOD,UA: NEGATIVE
SL AMB POCT CLARITY,UA: CLEAR
SL AMB POCT COLOR,UA: YELLOW
SL AMB POCT KETONES,UA: NEGATIVE
SL AMB POCT NITRITE,UA: NEGATIVE
SL AMB POCT PH,UA: 5.5
SL AMB POCT SPECIFIC GRAVITY,UA: 1.01
SL AMB POCT URINE PROTEIN: NEGATIVE
SL AMB POCT UROBILINOGEN: 0.2

## 2025-03-17 PROCEDURE — 99214 OFFICE O/P EST MOD 30 MIN: CPT | Performed by: PHYSICIAN ASSISTANT

## 2025-03-17 PROCEDURE — 81002 URINALYSIS NONAUTO W/O SCOPE: CPT | Performed by: PHYSICIAN ASSISTANT

## 2025-03-17 PROCEDURE — G2211 COMPLEX E/M VISIT ADD ON: HCPCS | Performed by: PHYSICIAN ASSISTANT

## 2025-03-17 RX ORDER — GLIPIZIDE 5 MG/1
5 TABLET, FILM COATED, EXTENDED RELEASE ORAL DAILY
COMMUNITY

## 2025-03-17 RX ORDER — RAMELTEON 8 MG/1
8 TABLET ORAL
Qty: 30 TABLET | Refills: 0 | Status: SHIPPED | OUTPATIENT
Start: 2025-03-17

## 2025-03-17 RX ORDER — CIPROFLOXACIN 500 MG/1
500 TABLET, FILM COATED ORAL EVERY 12 HOURS SCHEDULED
COMMUNITY

## 2025-03-17 RX ORDER — PHENAZOPYRIDINE HYDROCHLORIDE 100 MG/1
1 TABLET, FILM COATED ORAL 3 TIMES DAILY PRN
COMMUNITY
Start: 2024-12-13

## 2025-03-17 RX ORDER — RAMELTEON 8 MG/1
8 TABLET ORAL DAILY
COMMUNITY
Start: 2024-10-07 | End: 2025-03-17 | Stop reason: SDUPTHER

## 2025-03-17 NOTE — ASSESSMENT & PLAN NOTE
- Last seen by neurology 10/2024 who discontinued sinemet as neuro was concerned that patient could have drug induced parkinson's secondary to past geodon used

## 2025-03-17 NOTE — ASSESSMENT & PLAN NOTE
Lab Results   Component Value Date    HGBA1C 6.7 (H) 12/13/2024     Last A1c 6.7, at goal, will continue to monitor

## 2025-03-17 NOTE — PROGRESS NOTES
Name: Sylvie Rodríguez      : 1955      MRN: [id number]  Encounter Provider: Reece Lam PA-C  Encounter Date: 3/17/2025   Encounter department: [facility]IMARY [facility]ARE  :  Assessment & Plan  Parkinson's disease without dyskinesia, with fluctuating manifestations (HCC)     - Last seen by neurology 10/2024 who discontinued sinemet as neuro was concerned that patient could have drug induced parkinson's secondary to past geodon used        Type 2 diabetes mellitus without complication, with long-term current use of insulin (HCC)    Lab Results   Component Value Date    HGBA1C 6.7 (H) 2024     Last A1c 6.7, at goal, will continue to monitor          Cystocele with prolapse    Orders:    Ambulatory Referral to Urogynecology; Future    Mixed stress and urge urinary incontinence     - Pt presents w/ concern that pessary placed by urogyn on 3/6/2025 was spontaneously discharged during BM yesterday and she has been dealing with increased frequency, incontinence since discharge    - Pt had POCT urine collected that was negative for infection at this time    - PCP provided RX for incontinence supplies and explained that symptoms over the next couple days to be expected until uro gyn follow up, patient expressed understanding   Orders:    Incontinence Supply Disposable (Assurance Underwear Womens) MISC; Use continuous as needed (as needed for incontinence)    Ambulatory Referral to Urogynecology; Future    Sleep disturbance    Orders:    ramelteon (ROZEREM) 8 mg tablet; Take 1 tablet (8 mg total) by mouth daily at bedtime           History of Present Illness   Sylvie Rodríguez is a 69 y.o. female presenting with urinary concerns. Patient was seen by urogyn on 3/6/2025  and had pessary placed. Pessary was in place since visit until it was spontaneously discharged during BM yesterday and today patient reports increased incontinence, frequency. She denies any dysuria at this time.       Review of  Systems   Constitutional:  Negative for fatigue and fever.   Respiratory:  Negative for cough and shortness of breath.    Cardiovascular:  Negative for chest pain.   Gastrointestinal:  Negative for diarrhea, nausea and vomiting.   Genitourinary:  Positive for frequency. Negative for dysuria.   Neurological:  Negative for headaches.       Objective   /72   Pulse 97   Temp (!) 96.3 °F (35.7 °C)   Wt 64.4 kg (142 lb)   SpO2 99%   BMI 27.28 kg/m²      Physical Exam  Constitutional:       Appearance: Normal appearance.   HENT:      Head: Normocephalic.      Right Ear: External ear normal.      Left Ear: External ear normal.      Nose: Nose normal.      Mouth/Throat:      Mouth: Mucous membranes are moist.      Pharynx: Oropharynx is clear.   Eyes:      Conjunctiva/sclera: Conjunctivae normal.   Cardiovascular:      Rate and Rhythm: Normal rate and regular rhythm.      Heart sounds: Normal heart sounds.   Pulmonary:      Effort: Pulmonary effort is normal.      Breath sounds: Normal breath sounds.   Abdominal:      General: Bowel sounds are normal.      Palpations: Abdomen is soft.   Neurological:      Mental Status: She is alert and oriented to person, place, and time.   Psychiatric:         Behavior: Behavior normal.

## 2025-03-26 ENCOUNTER — NURSE TRIAGE (OUTPATIENT)
Age: 70
End: 2025-03-26

## 2025-03-26 NOTE — TELEPHONE ENCOUNTER
"FOLLOW UP: Son will leave work around lunchtime and take patient to Emergency Department for evaluation. He prefers to take her to Emergency Department himself.    REASON FOR CONVERSATION: Fever and Vomiting    SYMPTOMS: see Answer Assessment below    OTHER: patient seen at  Emergency Department 2 days ago, taking antibiotics as prescribed, symptoms are worsening    DISPOSITION: Go to ED Now      Reason for Disposition   Nursing judgment    Answer Assessment - Initial Assessment Questions  1. REASON FOR CALL: \"What is your main concern right now?\"      Patient seen at Emergency Department on 3/24/2025 for urinary issues and infection; patient has worsening symptoms including fever, vomiting, back pain, and poor PO intake    2. FEVER: \"Do you have a fever?\"      Yes    3. OTHER SYMPTOMS: \"Do you have any other new symptoms?\"      Vomiting, back pain, poor PO intake    Protocols used: No Protocol Available - Sick Adult-ADULT-[place]    "

## 2025-04-01 ENCOUNTER — OFFICE VISIT (OUTPATIENT)
Dept: FAMILY MEDICINE CLINIC | Facility: CLINIC | Age: 70
End: 2025-04-01
Payer: COMMERCIAL

## 2025-04-01 VITALS
WEIGHT: 137.4 LBS | DIASTOLIC BLOOD PRESSURE: 60 MMHG | TEMPERATURE: 96.2 F | HEART RATE: 66 BPM | OXYGEN SATURATION: 98 % | BODY MASS INDEX: 26.39 KG/M2 | SYSTOLIC BLOOD PRESSURE: 102 MMHG

## 2025-04-01 DIAGNOSIS — N39.3 SUI (STRESS URINARY INCONTINENCE, FEMALE): ICD-10-CM

## 2025-04-01 DIAGNOSIS — I10 PRIMARY HYPERTENSION: ICD-10-CM

## 2025-04-01 DIAGNOSIS — E87.8 OTHER DISORDERS OF ELECTROLYTE AND FLUID BALANCE, NOT ELSEWHERE CLASSIFIED: ICD-10-CM

## 2025-04-01 DIAGNOSIS — Z79.4 TYPE 2 DIABETES MELLITUS WITHOUT COMPLICATION, WITH LONG-TERM CURRENT USE OF INSULIN (HCC): Primary | ICD-10-CM

## 2025-04-01 DIAGNOSIS — Z97.8 FOLEY CATHETER IN PLACE: ICD-10-CM

## 2025-04-01 DIAGNOSIS — E78.5 DYSLIPIDEMIA: ICD-10-CM

## 2025-04-01 DIAGNOSIS — E11.9 TYPE 2 DIABETES MELLITUS WITHOUT COMPLICATION, WITH LONG-TERM CURRENT USE OF INSULIN (HCC): Primary | ICD-10-CM

## 2025-04-01 DIAGNOSIS — R19.5 DARK STOOLS: ICD-10-CM

## 2025-04-01 DIAGNOSIS — G47.9 SLEEP DISTURBANCE: ICD-10-CM

## 2025-04-01 PROCEDURE — G2211 COMPLEX E/M VISIT ADD ON: HCPCS | Performed by: FAMILY MEDICINE

## 2025-04-01 PROCEDURE — 99214 OFFICE O/P EST MOD 30 MIN: CPT | Performed by: FAMILY MEDICINE

## 2025-04-01 RX ORDER — ATORVASTATIN CALCIUM 40 MG/1
40 TABLET, FILM COATED ORAL DAILY
Qty: 90 TABLET | Refills: 1 | Status: SHIPPED | OUTPATIENT
Start: 2025-04-01

## 2025-04-01 RX ORDER — ENALAPRIL MALEATE 2.5 MG/1
2.5 TABLET ORAL DAILY
Qty: 90 TABLET | Refills: 1 | Status: SHIPPED | OUTPATIENT
Start: 2025-04-01

## 2025-04-01 RX ORDER — TIRZEPATIDE 2.5 MG/.5ML
0.5 INJECTION, SOLUTION SUBCUTANEOUS WEEKLY
Qty: 2 ML | Refills: 1 | Status: SHIPPED | OUTPATIENT
Start: 2025-04-01

## 2025-04-01 RX ORDER — METRONIDAZOLE 500 MG/1
500 TABLET ORAL 3 TIMES DAILY
COMMUNITY
Start: 2025-03-25 | End: 2025-04-01

## 2025-04-01 RX ORDER — RAMELTEON 8 MG/1
8 TABLET ORAL
Qty: 30 TABLET | Refills: 1 | Status: SHIPPED | OUTPATIENT
Start: 2025-04-01

## 2025-04-01 NOTE — ASSESSMENT & PLAN NOTE
Lab Results   Component Value Date    HGBA1C 6.7 (H) 12/13/2024     [facility] provided in office today.  Patient is to discontinue Synjardy and glipizide and only take her Mounjaro at this time.  Patient was confused on which medications to take but expressed understanding at this time and son was in agreement.  Orders:    Mounjaro 2.5 MG/0.5ML SOAJ; Inject 0.5 mL under the skin once a week

## 2025-04-01 NOTE — PROGRESS NOTES
Name: Sylvie Rodríguez      : 1955      MRN: [id number]  Encounter Provider: Semaj Perdue MD  Encounter Date: 2025   Encounter department: [facility] PRIMARY CARE  :  Assessment & Plan  Dark stools  Epigastric pain with dark stool that sounds like melena  Pt is taking pepcid but no PPI  Pt will need endoscopy for possible bleeding peptic ulcer  Last colonoscopy from 10/2024 showing tubular adenoma and will need repeat in 3 years. This epigastric pain was not present at that time of colonoscopy.   Proctitis was resolved on latest CT abdomen  Orders:    Ambulatory Referral to Gastroenterology; Future    DAVID (stress urinary incontinence, female)  Continuous loss of pessary due to straining with BM  Current urogyn no longer accepting pt insurance and will need new referral  Orders:    Ambulatory Referral to Urogynecology; Future    Ewing catheter in place  In place since 3/24/25.  Today, catheter bag is draining yellow/clear urine with no blood or clots.  No suprapubic tenderness, no fevers, no abdominal pain.  Patient has upcoming urology visit on 4/10/25       Primary hypertension  Patient in need of refills  Orders:    enalapril (VASOTEC) 2.5 mg tablet; Take 1 tablet (2.5 mg total) by mouth daily    Dyslipidemia  Patient in need of refills  Orders:    atorvastatin (LIPITOR) 40 mg tablet; Take 1 tablet (40 mg total) by mouth daily    Type 2 diabetes mellitus without complication, with long-term current use of insulin ([facility])    Lab Results   Component Value Date    HGBA1C 6.7 (H) 2024     MedRec provided in office today.  Patient is to discontinue Synjardy and glipizide and only take her Mounjaro at this time.  Patient was confused on which medications to take but expressed understanding at this time and son was in agreement.  Orders:    Mounjaro 2.5 MG/0.5ML SOAJ; Inject 0.5 mL under the skin once a week    Sleep disturbance  Patient in need of refills  Orders:    ramelteon (ROZEREM)  8 mg tablet; Take 1 tablet (8 mg total) by mouth daily at bedtime    Other disorders of electrolyte and fluid balance, not elsewhere classified  Most recent ED admission showing hyponatremia and leukocytosis  Will recheck at this time  Orders:    Comprehensive metabolic panel; Future    CBC and differential; Future           History of Present Illness   68 y/o female presenting to the clinic with son after a follow-up from the ED on 3/30/24 for a CC of burning epigastric pain with nausea and hiccups.  Labs in the ED showed hyponatremia and patient received 1 L nasal saline.  Urinalysis showed a yeast infection patient received Diflucan 200 mg.  A CT abdomen was done as below.  Patient was being treated for proctitis and finished her treatment of ciprofloxacin and Flagyl.  Patient has been on a Ewing since 3/20/2025 due to this proctitis inducing a urinary incontinence.  Patient does have a history of urinary incontinence and does follow with urogynecology at [facility] where a pessary was placed but unfortunately had fallen out multiple times due to straining and constipation.  Most recently, patient's urogynecologist no longer accepts her insurance and patient would need a new referral.  Currently patient denies any nausea, vomiting, abdominal pain, fevers.  When asked about bowel movements, patient states that she is still constipated but when she does go, the stool or dark black in color.  Son in room expressed want an change of home health nurse, but I informed patient that there are no [facility] providers in the [facility] and patient would have to call insurance for next steps.  Also advised son that patient has a urology appointment coming up on 4/10/25 for further Ewing catheter management.    CT Abdomen: No bowel obstruction or free air. The perirectal inflammatory changes appear to have markedly diminished since the previous exam. A Ewing catheter is noted in the urinary bladder. Hiatal hernia with evidence  of esophagitis.           Review of Systems   Constitutional:  Negative for chills and fever.   Respiratory:  Negative for shortness of breath and wheezing.    Cardiovascular:  Negative for chest pain.   Gastrointestinal:  Positive for blood in stool (Black color) and constipation. Negative for nausea and vomiting.        Mild pain in the upper abdominal area   Genitourinary:  Negative for difficulty urinating and hematuria.       Objective   /60   Pulse 66   Temp (!) 96.2 °F (35.7 °C)   Wt 62.3 kg (137 lb 6.4 oz)   SpO2 98%   BMI 26.39 kg/m²      Physical Exam  Vitals reviewed.   Constitutional:       General: She is not in acute distress.     Appearance: Normal appearance. She is not ill-appearing.   HENT:      Head: Normocephalic.   Eyes:      General: No scleral icterus.     Extraocular Movements: Extraocular movements intact.   Cardiovascular:      Rate and Rhythm: Normal rate and regular rhythm.      Pulses: Normal pulses.      Heart sounds: Normal heart sounds. No murmur heard.  Pulmonary:      Effort: Pulmonary effort is normal. No respiratory distress.      Breath sounds: Normal breath sounds.   Abdominal:      General: Bowel sounds are normal.      Palpations: Abdomen is soft.      Tenderness: There is no abdominal tenderness. There is no guarding.      Comments: Unable to appreciate epigastric tenderness   Genitourinary:     Comments: Ewing catheter in place draining clear/yellowish urine.  No blood or clots seen.  No suprapubic tenderness.  Musculoskeletal:      Right lower leg: No edema.      Left lower leg: No edema.   Skin:     General: Skin is warm.   Neurological:      Mental Status: She is alert.      Gait: Gait normal.   Psychiatric:         Mood and Affect: Mood normal.         Behavior: Behavior normal.

## 2025-04-01 NOTE — ASSESSMENT & PLAN NOTE
Continuous loss of pessary due to straining with BM  Current urogyn no longer accepting pt insurance and will need new referral  Orders:    Ambulatory Referral to Urogynecology; Future

## 2025-04-03 ENCOUNTER — APPOINTMENT (OUTPATIENT)
Dept: LAB | Facility: CLINIC | Age: 70
End: 2025-04-03
Payer: COMMERCIAL

## 2025-04-03 DIAGNOSIS — E87.8 OTHER DISORDERS OF ELECTROLYTE AND FLUID BALANCE, NOT ELSEWHERE CLASSIFIED: ICD-10-CM

## 2025-04-03 LAB
ALBUMIN SERPL BCG-MCNC: 3.8 G/DL (ref 3.5–5)
ALP SERPL-CCNC: 71 U/L (ref 34–104)
ALT SERPL W P-5'-P-CCNC: 43 U/L (ref 7–52)
ANION GAP SERPL CALCULATED.3IONS-SCNC: 10 MMOL/L (ref 4–13)
AST SERPL W P-5'-P-CCNC: 42 U/L (ref 13–39)
BASOPHILS # BLD AUTO: 0.05 THOUSANDS/ÂΜL (ref 0–0.1)
BASOPHILS NFR BLD AUTO: 1 % (ref 0–1)
BILIRUB SERPL-MCNC: 0.31 MG/DL (ref 0.2–1)
BUN SERPL-MCNC: 8 MG/DL (ref 5–25)
CALCIUM SERPL-MCNC: 8.9 MG/DL (ref 8.4–10.2)
CHLORIDE SERPL-SCNC: 98 MMOL/L (ref 96–108)
CO2 SERPL-SCNC: 28 MMOL/L (ref 21–32)
CREAT SERPL-MCNC: 0.42 MG/DL (ref 0.6–1.3)
EOSINOPHIL # BLD AUTO: 0.14 THOUSAND/ÂΜL (ref 0–0.61)
EOSINOPHIL NFR BLD AUTO: 1 % (ref 0–6)
ERYTHROCYTE [DISTWIDTH] IN BLOOD BY AUTOMATED COUNT: 14.6 % (ref 11.6–15.1)
GFR SERPL CREATININE-BSD FRML MDRD: 104 ML/MIN/1.73SQ M
GLUCOSE P FAST SERPL-MCNC: 131 MG/DL (ref 65–99)
HCT VFR BLD AUTO: 46.8 % (ref 34.8–46.1)
HGB BLD-MCNC: 14.9 G/DL (ref 11.5–15.4)
IMM GRANULOCYTES # BLD AUTO: 0.05 THOUSAND/UL (ref 0–0.2)
IMM GRANULOCYTES NFR BLD AUTO: 1 % (ref 0–2)
LYMPHOCYTES # BLD AUTO: 4.6 THOUSANDS/ÂΜL (ref 0.6–4.47)
LYMPHOCYTES NFR BLD AUTO: 44 % (ref 14–44)
MCH RBC QN AUTO: 30.8 PG (ref 26.8–34.3)
MCHC RBC AUTO-ENTMCNC: 31.8 G/DL (ref 31.4–37.4)
MCV RBC AUTO: 97 FL (ref 82–98)
MONOCYTES # BLD AUTO: 1.09 THOUSAND/ÂΜL (ref 0.17–1.22)
MONOCYTES NFR BLD AUTO: 10 % (ref 4–12)
NEUTROPHILS # BLD AUTO: 4.63 THOUSANDS/ÂΜL (ref 1.85–7.62)
NEUTS SEG NFR BLD AUTO: 43 % (ref 43–75)
NRBC BLD AUTO-RTO: 0 /100 WBCS
PLATELET # BLD AUTO: 374 THOUSANDS/UL (ref 149–390)
PMV BLD AUTO: 9.1 FL (ref 8.9–12.7)
POTASSIUM SERPL-SCNC: 3.8 MMOL/L (ref 3.5–5.3)
PROT SERPL-MCNC: 6.8 G/DL (ref 6.4–8.4)
RBC # BLD AUTO: 4.83 MILLION/UL (ref 3.81–5.12)
SODIUM SERPL-SCNC: 136 MMOL/L (ref 135–147)
WBC # BLD AUTO: 10.56 THOUSAND/UL (ref 4.31–10.16)

## 2025-04-03 PROCEDURE — 80053 COMPREHEN METABOLIC PANEL: CPT

## 2025-04-03 PROCEDURE — 36415 COLL VENOUS BLD VENIPUNCTURE: CPT

## 2025-04-03 PROCEDURE — 85025 COMPLETE CBC W/AUTO DIFF WBC: CPT

## 2025-04-21 ENCOUNTER — TELEPHONE (OUTPATIENT)
Dept: FAMILY MEDICINE CLINIC | Facility: CLINIC | Age: 70
End: 2025-04-21

## 2025-04-21 NOTE — TELEPHONE ENCOUNTER
Prior Auth Request received via fax from pharmacy for Ramelteon 8 MG oral tablet    No reference number to sent on form

## 2025-04-21 NOTE — TELEPHONE ENCOUNTER
PA for ramelteon (ROZEREM) 8 mg tablet SUBMITTED to [facility] [employer]    via    []CMM-KEY:   [x]Surescripts-Case ID # [id number]  []Availity-Auth ID # NDC #   []Faxed to plan   []Other website   []Phone call Case ID #     [x]PA sent as URGENT    All office notes, labs and other pertaining documents and studies sent. Clinical questions answered. Awaiting determination from insurance company.     Turnaround time for your insurance to make a decision on your Prior Authorization can take 7-21 business days.

## 2025-04-22 NOTE — TELEPHONE ENCOUNTER
PA for ramelteon (ROZEREM) 8 mg tablet  DENIED    Reason:(Screenshot if applicable)        Message sent to office clinical pool Yes    Denial letter scanned into Media Yes    Appeal started No (Provider will need to decide if appeal is warranted and send clinical documentation to Prior Authorization Team for initiation.)    **Please follow up with your patient regarding denial and next steps**

## 2025-04-23 ENCOUNTER — DOCUMENTATION (OUTPATIENT)
Dept: FAMILY MEDICINE CLINIC | Facility: CLINIC | Age: 70
End: 2025-04-23

## 2025-04-23 NOTE — TELEPHONE ENCOUNTER
Called patient's son to update on denial of Ramelteon. Our office is going to submit an appeal. He did not answer, left a voicemail to call the office at [phone].    Appeal faxed and confirmed at 3:44 PM

## 2025-05-07 ENCOUNTER — OFFICE VISIT (OUTPATIENT)
Dept: FAMILY MEDICINE CLINIC | Facility: CLINIC | Age: 70
End: 2025-05-07

## 2025-05-07 VITALS
SYSTOLIC BLOOD PRESSURE: 114 MMHG | BODY MASS INDEX: 26.51 KG/M2 | DIASTOLIC BLOOD PRESSURE: 60 MMHG | HEART RATE: 86 BPM | TEMPERATURE: 96 F | OXYGEN SATURATION: 99 % | WEIGHT: 138 LBS

## 2025-05-07 DIAGNOSIS — Z79.4 TYPE 2 DIABETES MELLITUS WITHOUT COMPLICATION, WITH LONG-TERM CURRENT USE OF INSULIN (HCC): Primary | ICD-10-CM

## 2025-05-07 DIAGNOSIS — E11.9 TYPE 2 DIABETES MELLITUS WITHOUT COMPLICATION, WITH LONG-TERM CURRENT USE OF INSULIN (HCC): Primary | ICD-10-CM

## 2025-05-07 RX ORDER — EMPAGLIFLOZIN, METFORMIN HYDROCHLORIDE 5; 1000 MG/1; MG/1
TABLET, EXTENDED RELEASE ORAL
COMMUNITY
Start: 2025-04-02

## 2025-05-07 NOTE — PROGRESS NOTES
Name: Sylvie Rodríguez      : 1955      MRN: [id number]  Encounter Provider: Semaj Perdue MD  Encounter Date: 2025   Encounter department: [facility] PRIMARY CARE  :  Assessment & Plan  Type 2 diabetes mellitus without complication, with long-term current use of insulin ([facility])    Lab Results   Component Value Date    HGBA1C 6.7 (H) 2024     Well-controlled on Synjardy and Mounjaro  Patient has stopped glipizide as advised last visit  Will recheck ACR and A1c prior to next visit  Orders:    Hemoglobin A1C; Future    Albumin / creatinine urine ratio; Future    Ambulatory Referral to Ophthalmology; Future           History of Present Illness   Patient presenting to the clinic with son for recheck on prior urinary complaints.  Patient is feeling very well at this time and just returned from a trip to [place] where she was for 2 weeks.  Patient's catheter has been removed and completed her antibiotics for UTI.  Patient denies any fevers, chills, abdominal pain or any blood in her urine.  Patient has a follow-up urology appointment tomorrow with [facility].  Labs reviewed and are appropriate.     Patient is currently taking Synjardy and Mounjaro to help control diabetes and I advised patient and son for a yearly ophthalmology checks which they were agreeable to.      Review of Systems   Constitutional:  Negative for chills and fever.   Respiratory:  Negative for shortness of breath.    Cardiovascular:  Negative for chest pain.   Gastrointestinal:  Negative for abdominal pain, nausea and vomiting.   Genitourinary:  Negative for hematuria.       Objective   /60   Pulse 86   Temp (!) 96 °F (35.6 °C)   Wt 62.6 kg (138 lb)   SpO2 99%   BMI 26.51 kg/m²      Physical Exam  Vitals reviewed.   Constitutional:       Appearance: Normal appearance.   HENT:      Head: Normocephalic and atraumatic.   Eyes:      General: No scleral icterus.     Extraocular Movements: Extraocular movements  intact.      Conjunctiva/sclera: Conjunctivae normal.   Cardiovascular:      Rate and Rhythm: Normal rate and regular rhythm.      Pulses: Normal pulses.      Heart sounds: Normal heart sounds. No murmur heard.  Pulmonary:      Effort: Pulmonary effort is normal. No respiratory distress.      Breath sounds: Normal breath sounds. No wheezing.   Abdominal:      General: Bowel sounds are normal.      Palpations: Abdomen is soft.      Tenderness: There is no guarding.   Musculoskeletal:      Right lower leg: No edema.      Left lower leg: No edema.   Skin:     General: Skin is warm.      Capillary Refill: Capillary refill takes less than 2 seconds.   Neurological:      Mental Status: She is alert.      Gait: Gait normal.   Psychiatric:         Mood and Affect: Mood normal.         Behavior: Behavior normal.

## 2025-05-07 NOTE — ASSESSMENT & PLAN NOTE
Lab Results   Component Value Date    HGBA1C 6.7 (H) 12/13/2024     Well-controlled on Synjardy and Mounjaro  Patient has stopped glipizide as advised last visit  Will recheck ACR and A1c prior to next visit  Orders:    Hemoglobin A1C; Future    Albumin / creatinine urine ratio; Future    Ambulatory Referral to Ophthalmology; Future

## 2025-05-27 DIAGNOSIS — Z79.4 TYPE 2 DIABETES MELLITUS WITHOUT COMPLICATION, WITH LONG-TERM CURRENT USE OF INSULIN (HCC): ICD-10-CM

## 2025-05-27 DIAGNOSIS — E11.9 TYPE 2 DIABETES MELLITUS WITHOUT COMPLICATION, WITH LONG-TERM CURRENT USE OF INSULIN (HCC): ICD-10-CM

## 2025-05-29 RX ORDER — TIRZEPATIDE 2.5 MG/.5ML
0.5 INJECTION, SOLUTION SUBCUTANEOUS WEEKLY
Qty: 2 ML | Refills: 1 | Status: SHIPPED | OUTPATIENT
Start: 2025-05-29

## 2025-06-23 DIAGNOSIS — I10 PRIMARY HYPERTENSION: ICD-10-CM

## 2025-06-24 RX ORDER — ENALAPRIL MALEATE 2.5 MG/1
2.5 TABLET ORAL DAILY
Qty: 90 TABLET | Refills: 1 | Status: SHIPPED | OUTPATIENT
Start: 2025-06-24

## 2025-06-27 ENCOUNTER — TELEPHONE (OUTPATIENT)
Age: 70
End: 2025-06-27

## 2025-06-27 NOTE — TELEPHONE ENCOUNTER
Pharmacy called in to report the patients previous dr will not fill this medication (Synjardy XR 5-1000 MG TB24) anymore.  PCP please reevaluate if med is appropriate and reorder. Please call pharmacy they requested to further discuss.

## 2025-06-30 NOTE — TELEPHONE ENCOUNTER
Hey Ladies,     It looks like this is part of our planned regimen for this patient, but oddly enough she has been getting it filled under Dr. Morataya's prescription? Can we call and see if she requires refill at this time?     Magaly

## 2025-07-02 DIAGNOSIS — R79.0 DECREASED FERRITIN: ICD-10-CM

## 2025-07-03 RX ORDER — FERROUS SULFATE 324(65)MG
324 TABLET, DELAYED RELEASE (ENTERIC COATED) ORAL
Qty: 30 TABLET | Refills: 0 | Status: SHIPPED | OUTPATIENT
Start: 2025-07-03

## 2025-07-03 NOTE — TELEPHONE ENCOUNTER
Patient needs updated blood work. Please place orders. A courtesy refill was provided.     Pt needs Iron panel done

## 2025-07-03 NOTE — TELEPHONE ENCOUNTER
Called patient to ask if she was still taking the Synjardy since it was discontinued and let her know we need labwork. No answer, no voicemail.

## 2025-07-03 NOTE — TELEPHONE ENCOUNTER
Called patient's son and relayed message, he will make sure mom is not taking Synjardy and he is aware labs are needed.

## 2025-07-10 ENCOUNTER — OFFICE VISIT (OUTPATIENT)
Dept: FAMILY MEDICINE CLINIC | Facility: CLINIC | Age: 70
End: 2025-07-10
Payer: COMMERCIAL

## 2025-07-10 VITALS
SYSTOLIC BLOOD PRESSURE: 116 MMHG | BODY MASS INDEX: 27.78 KG/M2 | WEIGHT: 144.6 LBS | OXYGEN SATURATION: 98 % | TEMPERATURE: 96.4 F | DIASTOLIC BLOOD PRESSURE: 62 MMHG | HEART RATE: 84 BPM

## 2025-07-10 DIAGNOSIS — K90.89 POOR IRON ABSORPTION: ICD-10-CM

## 2025-07-10 DIAGNOSIS — Z00.00 MEDICARE ANNUAL WELLNESS VISIT, SUBSEQUENT: Primary | ICD-10-CM

## 2025-07-10 DIAGNOSIS — Z12.31 ENCOUNTER FOR SCREENING MAMMOGRAM FOR BREAST CANCER: ICD-10-CM

## 2025-07-10 DIAGNOSIS — Z79.4 TYPE 2 DIABETES MELLITUS WITHOUT COMPLICATION, WITH LONG-TERM CURRENT USE OF INSULIN (HCC): ICD-10-CM

## 2025-07-10 DIAGNOSIS — E11.9 TYPE 2 DIABETES MELLITUS WITHOUT COMPLICATION, WITH LONG-TERM CURRENT USE OF INSULIN (HCC): ICD-10-CM

## 2025-07-10 LAB
DME PARACHUTE DELIVERY DATE REQUESTED: NORMAL
DME PARACHUTE ITEM DESCRIPTION: NORMAL
DME PARACHUTE ITEM DESCRIPTION: NORMAL
DME PARACHUTE ORDER STATUS: NORMAL
DME PARACHUTE SUPPLIER NAME: NORMAL
DME PARACHUTE SUPPLIER PHONE: NORMAL
SL AMB POCT HEMOGLOBIN AIC: 6.3 (ref ?–6.5)

## 2025-07-10 PROCEDURE — 82043 UR ALBUMIN QUANTITATIVE: CPT | Performed by: PHYSICIAN ASSISTANT

## 2025-07-10 PROCEDURE — 82570 ASSAY OF URINE CREATININE: CPT | Performed by: PHYSICIAN ASSISTANT

## 2025-07-10 PROCEDURE — G0439 PPPS, SUBSEQ VISIT: HCPCS | Performed by: PHYSICIAN ASSISTANT

## 2025-07-10 PROCEDURE — 83036 HEMOGLOBIN GLYCOSYLATED A1C: CPT | Performed by: PHYSICIAN ASSISTANT

## 2025-07-10 RX ORDER — CEFPODOXIME PROXETIL 200 MG/1
1 TABLET, FILM COATED ORAL 2 TIMES DAILY
COMMUNITY
Start: 2025-04-11

## 2025-07-10 NOTE — ASSESSMENT & PLAN NOTE
- A1c collected and 6.3, DM well controlled    - DM foot exam WNL    - Continue currently prescribed plan   Lab Results   Component Value Date    HGBA1C 6.3 07/10/2025       Orders:    POCT hemoglobin A1c    Albumin / creatinine urine ratio; Future

## 2025-07-10 NOTE — PROGRESS NOTES
Name: Sylvie Rodríguez      : 1955      MRN: [id number]  Encounter Provider: Reece Lam PA-C  Encounter Date: 7/10/2025   Encounter department: [facility] PRIMARY CARE  :  Assessment & Plan  Medicare annual wellness visit, subsequent     - UTD on all preventative screenings        Encounter for screening mammogram for breast cancer    Orders:    Mammo screening bilateral w 3d and cad; Future    Type 2 diabetes mellitus without complication, with long-term current use of insulin ([facility])     - A1c collected and 6.3, DM well controlled    - DM foot exam WNL    - Continue currently prescribed plan   Lab Results   Component Value Date    HGBA1C 6.3 07/10/2025       Orders:    POCT hemoglobin A1c    Albumin / creatinine urine ratio; Future    Poor iron absorption    Orders:    Iron Panel (Includes Ferritin, Iron Sat%, Iron, and TIBC); Future      Depression Screening and Follow-up Plan: Patient was screened for depression during today's encounter. They screened negative with a PHQ-9 score of 4.      Urinary Incontinence Plan of Care: counseling topics discussed: practice Kegel (pelvic floor strengthening) exercises, use restroom every 2 hours, limit alcohol, caffeine, spicy foods, and acidic foods, limiting fluid intake 3-4 hours before bed and limiting fluid intake to 60 oz. per day.       Preventive health issues were discussed with patient, and age appropriate screening tests were ordered as noted in patient's After Visit Summary. Personalized health advice and appropriate referrals for health education or preventive services given if needed, as noted in patient's After Visit Summary.    History of Present Illness     HPI   Patient Care Team:  Cindy Saleh MD as PCP - General (Family Medicine)  Twin Morataya MD as PCP - PCP-[facility] ([facility])  Cindy Saleh MD as PCP - PCP-[facility] ([facility])    Review of Systems   Constitutional:  Negative for fatigue and fever.    Respiratory:  Negative for cough and shortness of breath.    Cardiovascular:  Negative for chest pain.   Gastrointestinal:  Negative for diarrhea, nausea and vomiting.   Neurological:  Negative for headaches.     Medical History Reviewed by provider this encounter:  Tobacco  Allergies  Meds  Problems  Med Hx  Surg Hx  Fam Hx       Annual Wellness Visit Questionnaire   Sylvie is here for her Subsequent Wellness visit.     Health Risk Assessment:   Patient rates overall health as fair. Patient feels that their physical health rating is same. Patient is satisfied with their life. Eyesight was rated as slightly worse. Hearing was rated as same. Patient feels that their emotional and mental health rating is same. Patients states they are sometimes angry. Patient states they are sometimes unusually tired/fatigued. Pain experienced in the last 7 days has been none. Patient states that she has experienced no weight loss or gain in last 6 months.     Depression Screening:   PHQ-9 Score: 4      Fall Risk Screening:   In the past year, patient has experienced: no history of falling in past year      Urinary Incontinence Screening:   Patient has leaked urine accidently in the last six months.     Home Safety:  Patient does not have trouble with stairs inside or outside of their home. Patient has no working smoke alarms and has no working carbon monoxide detector. Home safety hazards include: none.     Nutrition:   Current diet is Regular.     Medications:   Patient is currently taking over-the-counter supplements. OTC medications include: see medication list. Patient is not able to manage medications.     Activities of Daily Living (ADLs)/Instrumental Activities of Daily Living (IADLs):   Walk and transfer into and out of bed and chair?: Yes  Dress and groom yourself?: Yes    Bathe or shower yourself?: Yes    Feed yourself? Yes  Do your laundry/housekeeping?: Yes  Manage your money, pay your bills and track your  expenses?: Yes  Make your own meals?: No    Do your own shopping?: No    Previous Hospitalizations:   Any hospitalizations or ED visits within the last 12 months?: Yes      Advance Care Planning:   Living will: No    Durable POA for healthcare: No    Advanced directive: No    Advanced directive counseling given: Yes    ACP document given: Yes      Preventive Screenings      Cardiovascular Screening:    General: Screening Not Indicated and History Lipid Disorder      Diabetes Screening:     General: Screening Not Indicated and History Diabetes      Colorectal Cancer Screening:     General: Screening Current      Breast Cancer Screening:     General: Screening Current      Cervical Cancer Screening:    General: Screening Not Indicated      Osteoporosis Screening:    General: Screening Not Indicated and History Osteoporosis      Abdominal Aortic Aneurysm (AAA) Screening:        General: Screening Not Indicated      Lung Cancer Screening:     General: Screening Not Indicated      Hepatitis C Screening:    General: Screening Current    Immunizations:  - Immunizations due: Zoster (Shingrix)    Screening, Brief Intervention, and Referral to Treatment (SBIRT)     Screening  Typical number of drinks in a day: 0  Typical number of drinks in a week: 0  Interpretation: Low risk drinking behavior.    AUDIT-C Screenin) How often did you have a drink containing alcohol in the past year? never  2) How many drinks did you have on a typical day when you were drinking in the past year? 0  3) How often did you have 6 or more drinks on one occasion in the past year? never    AUDIT-C Score: 0  Interpretation: Score 0-2 (female): Negative screen for alcohol misuse    Single Item Drug Screening:  How often have you used an illegal drug (including marijuana) or a prescription medication for non-medical reasons in the past year? never    Single Item Drug Screen Score: 0  Interpretation: Negative screen for possible drug use  disorder    Other Counseling Topics:   Calcium and vitamin D intake and regular weightbearing exercise.     Social Drivers of Health     Food Insecurity: No Food Insecurity (7/10/2025)    Nursing - Inadequate Food Risk Classification     Worried About Running Out of Food in the Last Year: Never true     Ran Out of Food in the Last Year: Never true   Transportation Needs: No Transportation Needs (7/10/2025)    PRAPARE - Transportation     Lack of Transportation (Medical): No     Lack of Transportation (Non-Medical): No   Housing Stability: Low Risk  (7/10/2025)    Housing Stability Vital Sign     Unable to Pay for Housing in the Last Year: No     Number of Times Moved in the Last Year: 0     Homeless in the Last Year: No   Utilities: Not At Risk (7/10/2025)    [facility] Utilities     Threatened with loss of utilities: No     No results found.    Objective   /62   Pulse 84   Temp (!) 96.4 °F (35.8 °C)   Wt 65.6 kg (144 lb 9.6 oz)   SpO2 98%   BMI 27.78 kg/m²     Physical Exam  Constitutional:       Appearance: Normal appearance.   HENT:      Head: Normocephalic.      Right Ear: External ear normal.      Left Ear: External ear normal.      Nose: Nose normal.      Mouth/Throat:      Mouth: Mucous membranes are moist.      Pharynx: Oropharynx is clear.     Eyes:      Conjunctiva/sclera: Conjunctivae normal.       Cardiovascular:      Rate and Rhythm: Normal rate and regular rhythm.      Pulses: no weak pulses.           Dorsalis pedis pulses are 2+ on the right side and 2+ on the left side.        Posterior tibial pulses are 2+ on the right side and 2+ on the left side.      Heart sounds: Normal heart sounds.   Pulmonary:      Effort: Pulmonary effort is normal.      Breath sounds: Normal breath sounds.   Abdominal:      General: Bowel sounds are normal.      Palpations: Abdomen is soft.   Feet:      Right foot:      Skin integrity: No ulcer, skin breakdown, erythema, warmth, callus or dry skin.      Left foot:       Skin integrity: No ulcer, skin breakdown, erythema, warmth, callus or dry skin.     Neurological:      Mental Status: She is alert and oriented to person, place, and time.     Psychiatric:         Behavior: Behavior normal.     Diabetic Foot Exam    Patient's shoes and socks removed.    Right Foot/Ankle   Right Foot Inspection  Skin Exam: skin normal and skin intact. No dry skin, no warmth, no callus, no erythema, no maceration, no abnormal color, no pre-ulcer, no ulcer and no callus.     Toe Exam: ROM and strength within normal limits.     Sensory   Vibration: intact  Proprioception: intact  Monofilament testing: intact    Vascular  Capillary refills: < 3 seconds  The right DP pulse is 2+. The right PT pulse is 2+.     Left Foot/Ankle  Left Foot Inspection  Skin Exam: skin normal and skin intact. No dry skin, no warmth, no erythema, no maceration, normal color, no pre-ulcer, no ulcer and no callus.     Toe Exam: ROM and strength within normal limits.     Sensory   Vibration: intact  Proprioception: intact  Monofilament testing: intact    Vascular  Capillary refills: < 3 seconds  The left DP pulse is 2+. The left PT pulse is 2+.     Assign Risk Category  No deformity present  No loss of protective sensation  No weak pulses  Risk: 0

## 2025-07-10 NOTE — PATIENT INSTRUCTIONS
Medicare Preventive Visit Patient Instructions  Thank you for completing your Welcome to Medicare Visit or Medicare Annual Wellness Visit today. Your next wellness visit will be due in one year (7/11/2026).  The screening/preventive services that you may require over the next 5-10 years are detailed below. Some tests may not apply to you based off risk factors and/or age. Screening tests ordered at today's visit but not completed yet may show as past due. Also, please note that scanned in results may not display below.  Preventive Screenings:  Service Recommendations Previous Testing/Comments   Colorectal Cancer Screening  * Colonoscopy    * Fecal Occult Blood Test (FOBT)/Fecal Immunochemical Test (FIT)  * Fecal DNA/Cologuard Test  * Flexible Sigmoidoscopy Age: 45-75 years old   Colonoscopy: every 10 years (may be performed more frequently if at higher risk)  OR  FOBT/FIT: every 1 year  OR  Cologuard: every 3 years  OR  Sigmoidoscopy: every 5 years  Screening may be recommended earlier than age 45 if at higher risk for colorectal cancer. Also, an individualized decision between you and your healthcare provider will decide whether screening between the ages of 76-85 would be appropriate. Colonoscopy: 09/20/2024  FOBT/FIT: Not on file  Cologuard: Not on file  Sigmoidoscopy: Not on file    Screening Current     Breast Cancer Screening Age: 40+ years old  Frequency: every 1-2 years  Not required if history of left and right mastectomy Mammogram: 11/11/2024    Screening Current   Cervical Cancer Screening Between the ages of 21-29, pap smear recommended once every 3 years.   Between the ages of 30-65, can perform pap smear with HPV co-testing every 5 years.   Recommendations may differ for women with a history of total hysterectomy, cervical cancer, or abnormal pap smears in past. Pap Smear: Not on file    Screening Not Indicated   Hepatitis C Screening Once for adults born between 1945 and 1965  More frequently in  patients at high risk for Hepatitis C Hep C Antibody: 04/25/2024    Screening Current   Diabetes Screening 1-2 times per year if you're at risk for diabetes or have pre-diabetes Fasting glucose: 131 mg/dL (4/3/2025)  A1C: 6.7 % (12/13/2024)  Screening Not Indicated  History Diabetes   Cholesterol Screening Once every 5 years if you don't have a lipid disorder. May order more often based on risk factors. Lipid panel: 12/13/2024    Screening Not Indicated  History Lipid Disorder     Other Preventive Screenings Covered by Medicare:  Abdominal Aortic Aneurysm (AAA) Screening: covered once if your at risk. You're considered to be at risk if you have a family history of AAA.  Lung Cancer Screening: covers low dose CT scan once per year if you meet all of the following conditions: (1) Age 55-77; (2) No signs or symptoms of lung cancer; (3) Current smoker or have quit smoking within the last 15 years; (4) You have a tobacco smoking history of at least 20 pack years (packs per day multiplied by number of years you smoked); (5) You get a written order from a healthcare provider.  Glaucoma Screening: covered annually if you're considered high risk: (1) You have diabetes OR (2) Family history of glaucoma OR (3)  aged 50 and older OR (4)  American aged 65 and older  Osteoporosis Screening: covered every 2 years if you meet one of the following conditions: (1) You're estrogen deficient and at risk for osteoporosis based off medical history and other findings; (2) Have a vertebral abnormality; (3) On glucocorticoid therapy for more than 3 months; (4) Have primary hyperparathyroidism; (5) On osteoporosis medications and need to assess response to drug therapy.   Last bone density test (DXA Scan): 11/11/2024.  HIV Screening: covered annually if you're between the age of 15-65. Also covered annually if you are younger than 15 and older than 65 with risk factors for HIV infection. For pregnant patients, it is  covered up to 3 times per pregnancy.    Immunizations:  Immunization Recommendations   Influenza Vaccine Annual influenza vaccination during flu season is recommended for all persons aged >= 6 months who do not have contraindications   Pneumococcal Vaccine   * Pneumococcal conjugate vaccine = PCV13 (Prevnar 13), PCV15 (Vaxneuvance), PCV20 (Prevnar 20)  * Pneumococcal polysaccharide vaccine = PPSV23 (Pneumovax) Adults 19-65 yo with certain risk factors or if 65+ yo  If never received any pneumonia vaccine: recommend Prevnar 20 (PCV20)  Give PCV20 if previously received 1 dose of PCV13 or PPSV23   Hepatitis B Vaccine 3 dose series if at intermediate or high risk (ex: diabetes, end stage renal disease, liver disease)   Respiratory syncytial virus (RSV) Vaccine - COVERED BY MEDICARE PART D  * RSVPreF3 (Arexvy) CDC recommends that adults 60 years of age and older may receive a single dose of RSV vaccine using shared clinical decision-making (SCDM)   Tetanus (Td) Vaccine - COST NOT COVERED BY MEDICARE PART B Following completion of primary series, a booster dose should be given every 10 years to maintain immunity against tetanus. Td may also be given as tetanus wound prophylaxis.   Tdap Vaccine - COST NOT COVERED BY MEDICARE PART B Recommended at least once for all adults. For pregnant patients, recommended with each pregnancy.   Shingles Vaccine (Shingrix) - COST NOT COVERED BY MEDICARE PART B  2 shot series recommended in those 19 years and older who have or will have weakened immune systems or those 50 years and older     Health Maintenance Due:      Topic Date Due   • Breast Cancer Screening: Mammogram  11/11/2025   • DXA SCAN  11/11/2026   • Colorectal Cancer Screening  09/20/2027   • Hepatitis C Screening  Completed     Immunizations Due:      Topic Date Due   • COVID-19 Vaccine (5 - 2024-25 season) 09/01/2024   • Influenza Vaccine (1) 09/01/2025     Advance Directives   What are advance directives?  Advance  directives are legal documents that state your wishes and plans for medical care. These plans are made ahead of time in case you lose your ability to make decisions for yourself. Advance directives can apply to any medical decision, such as the treatments you want, and if you want to donate organs.   What are the types of advance directives?  There are many types of advance directives, and each state has rules about how to use them. You may choose a combination of any of the following:  Living will:  This is a written record of the treatment you want. You can also choose which treatments you do not want, which to limit, and which to stop at a certain time. This includes surgery, medicine, IV fluid, and tube feedings.   Durable power of  for healthcare (DPAHC):  This is a written record that states who you want to make healthcare choices for you when you are unable to make them for yourself. This person, called a proxy, is usually a family member or a friend. You may choose more than 1 proxy.  Do not resuscitate (DNR) order:  A DNR order is used in case your heart stops beating or you stop breathing. It is a request not to have certain forms of treatment, such as CPR. A DNR order may be included in other types of advance directives.  Medical directive:  This covers the care that you want if you are in a coma, near death, or unable to make decisions for yourself. You can list the treatments you want for each condition. Treatment may include pain medicine, surgery, blood transfusions, dialysis, IV or tube feedings, and a ventilator (breathing machine).  Values history:  This document has questions about your views, beliefs, and how you feel and think about life. This information can help others choose the care that you would choose.  Why are advance directives important?  An advance directive helps you control your care. Although spoken wishes may be used, it is better to have your wishes written down. Spoken  wishes can be misunderstood, or not followed. Treatments may be given even if you do not want them. An advance directive may make it easier for your family to make difficult choices about your care.   Urinary Incontinence   Urinary incontinence (UI)  is when you lose control of your bladder. UI develops because your bladder cannot store or empty urine properly. The 3 most common types of UI are stress incontinence, urge incontinence, or both.  Medicines:   May be given to help strengthen your bladder control. Report any side effects of medication to your healthcare provider.  Do pelvic muscle exercises often:  Your pelvic muscles help you stop urinating. Squeeze these muscles tight for 5 seconds, then relax for 5 seconds. Gradually work up to squeezing for 10 seconds. Do 3 sets of 15 repetitions a day, or as directed. This will help strengthen your pelvic muscles and improve bladder control.  Train your bladder:  Go to the bathroom at set times, such as every 2 hours, even if you do not feel the urge to go. You can also try to hold your urine when you feel the urge to go. For example, hold your urine for 5 minutes when you feel the urge to go. As that becomes easier, hold your urine for 10 minutes.   Self-care:   Keep a UI record.  Write down how often you leak urine and how much you leak. Make a note of what you were doing when you leaked urine.  Drink liquids as directed. You may need to limit the amount of liquid you drink to help control your urine leakage. Do not drink any liquid right before you go to bed. Limit or do not have drinks that contain caffeine or alcohol.   Prevent constipation.  Eat a variety of high-fiber foods. Good examples are high-fiber cereals, beans, vegetables, and whole-grain breads. Walking is the best way to trigger your intestines to have a bowel movement.  Exercise regularly and maintain a healthy weight.  Weight loss and exercise will decrease pressure on your bladder and help you  control your leakage.   Use a catheter as directed  to help empty your bladder. A catheter is a tiny, plastic tube that is put into your bladder to drain your urine.   Go to behavior therapy as directed.  Behavior therapy may be used to help you learn to control your urge to urinate.    Weight Management   Why it is important to manage your weight:  Being overweight increases your risk of health conditions such as heart disease, high blood pressure, type 2 diabetes, and certain types of cancer. It can also increase your risk for osteoarthritis, sleep apnea, and other respiratory problems. Aim for a slow, steady weight loss. Even a small amount of weight loss can lower your risk of health problems.  How to lose weight safely:  A safe and healthy way to lose weight is to eat fewer calories and get regular exercise. You can lose up about 1 pound a week by decreasing the number of calories you eat by 500 calories each day.   Healthy meal plan for weight management:  A healthy meal plan includes a variety of foods, contains fewer calories, and helps you stay healthy. A healthy meal plan includes the following:  Eat whole-grain foods more often.  A healthy meal plan should contain fiber. Fiber is the part of grains, fruits, and vegetables that is not broken down by your body. Whole-grain foods are healthy and provide extra fiber in your diet. Some examples of whole-grain foods are whole-wheat breads and pastas, oatmeal, brown rice, and bulgur.  Eat a variety of vegetables every day.  Include dark, leafy greens such as spinach, kale, devika greens, and mustard greens. Eat yellow and orange vegetables such as carrots, sweet potatoes, and winter squash.   Eat a variety of fruits every day.  Choose fresh or canned fruit (canned in its own juice or light syrup) instead of juice. Fruit juice has very little or no fiber.  Eat low-fat dairy foods.  Drink fat-free (skim) milk or 1% milk. Eat fat-free yogurt and low-fat cottage  cheese. Try low-fat cheeses such as mozzarella and other reduced-fat cheeses.  Choose meat and other protein foods that are low in fat.  Choose beans or other legumes such as split peas or lentils. Choose fish, skinless poultry (chicken or turkey), or lean cuts of red meat (beef or pork). Before you cook meat or poultry, cut off any visible fat.   Use less fat and oil.  Try baking foods instead of frying them. Add less fat, such as margarine, sour cream, regular salad dressing and mayonnaise to foods. Eat fewer high-fat foods. Some examples of high-fat foods include french fries, doughnuts, ice cream, and cakes.  Eat fewer sweets.  Limit foods and drinks that are high in sugar. This includes candy, cookies, regular soda, and sweetened drinks.  Exercise:  Exercise at least 30 minutes per day on most days of the week. Some examples of exercise include walking, biking, dancing, and swimming. You can also fit in more physical activity by taking the stairs instead of the elevator or parking farther away from stores. Ask your healthcare provider about the best exercise plan for you.    © Copyright [employer] 2018 Information is for End User's use only and may not be sold, redistributed or otherwise used for commercial purposes. All illustrations and images included in CareNotes® are the copyrighted property of [employer] or [employer]

## 2025-07-11 LAB
CREAT UR-MCNC: 20.6 MG/DL
MICROALBUMIN UR-MCNC: <7 MG/L

## 2025-07-15 ENCOUNTER — TELEPHONE (OUTPATIENT)
Age: 70
End: 2025-07-15

## 2025-07-15 DIAGNOSIS — Z79.4 TYPE 2 DIABETES MELLITUS WITHOUT COMPLICATION, WITH LONG-TERM CURRENT USE OF INSULIN (HCC): Primary | ICD-10-CM

## 2025-07-15 DIAGNOSIS — E11.9 TYPE 2 DIABETES MELLITUS WITHOUT COMPLICATION, WITH LONG-TERM CURRENT USE OF INSULIN (HCC): Primary | ICD-10-CM

## 2025-07-17 RX ORDER — DULAGLUTIDE 0.75 MG/.5ML
0.75 INJECTION, SOLUTION SUBCUTANEOUS WEEKLY
Qty: 5 ML | Refills: 3 | Status: SHIPPED | OUTPATIENT
Start: 2025-07-17

## 2025-07-17 NOTE — TELEPHONE ENCOUNTER
Please call patient and inform her that unfortunately insurance is no longer covering Mounjaro. Hence, I have sent over Trulicity which should be covered per insurance.

## 2025-07-18 NOTE — TELEPHONE ENCOUNTER
Called patient via  #[phone] to relay message below.  Patient understood and had no further questions or concerns.

## 2025-07-24 DIAGNOSIS — E78.5 DYSLIPIDEMIA: ICD-10-CM

## 2025-07-24 RX ORDER — ATORVASTATIN CALCIUM 40 MG/1
40 TABLET, FILM COATED ORAL DAILY
Qty: 90 TABLET | Refills: 1 | Status: SHIPPED | OUTPATIENT
Start: 2025-07-24

## 2025-08-01 DIAGNOSIS — K21.9 GASTROESOPHAGEAL REFLUX DISEASE WITHOUT ESOPHAGITIS: ICD-10-CM

## 2025-08-01 RX ORDER — FAMOTIDINE 40 MG/1
40 TABLET, FILM COATED ORAL DAILY
Qty: 90 TABLET | Refills: 1 | Status: SHIPPED | OUTPATIENT
Start: 2025-08-01